# Patient Record
Sex: MALE | Race: WHITE | NOT HISPANIC OR LATINO | URBAN - METROPOLITAN AREA
[De-identification: names, ages, dates, MRNs, and addresses within clinical notes are randomized per-mention and may not be internally consistent; named-entity substitution may affect disease eponyms.]

---

## 2022-10-18 ENCOUNTER — TELEMEDICINE (OUTPATIENT)
Dept: PSYCHIATRY | Facility: CLINIC | Age: 32
End: 2022-10-18
Payer: COMMERCIAL

## 2022-10-18 DIAGNOSIS — F98.8 ATTENTION DEFICIT DISORDER PREDOMINANT INATTENTIVE TYPE: Primary | ICD-10-CM

## 2022-10-18 DIAGNOSIS — F41.1 GAD (GENERALIZED ANXIETY DISORDER): ICD-10-CM

## 2022-10-18 PROCEDURE — 99213 OFFICE O/P EST LOW 20 MIN: CPT | Performed by: NURSE PRACTITIONER

## 2022-10-18 RX ORDER — DEXTROAMPHETAMINE SACCHARATE, AMPHETAMINE ASPARTATE MONOHYDRATE, DEXTROAMPHETAMINE SULFATE AND AMPHETAMINE SULFATE 5; 5; 5; 5 MG/1; MG/1; MG/1; MG/1
20 CAPSULE, EXTENDED RELEASE ORAL EVERY MORNING
COMMUNITY
End: 2022-10-18 | Stop reason: SDUPTHER

## 2022-10-18 RX ORDER — DEXTROAMPHETAMINE SACCHARATE, AMPHETAMINE ASPARTATE MONOHYDRATE, DEXTROAMPHETAMINE SULFATE AND AMPHETAMINE SULFATE 5; 5; 5; 5 MG/1; MG/1; MG/1; MG/1
20 CAPSULE, EXTENDED RELEASE ORAL EVERY MORNING
Qty: 90 CAPSULE | Refills: 0 | Status: SHIPPED | OUTPATIENT
Start: 2022-10-18

## 2022-10-18 RX ORDER — BUSPIRONE HYDROCHLORIDE 10 MG/1
10 TABLET ORAL 2 TIMES DAILY
Qty: 60 TABLET | Refills: 1 | Status: SHIPPED | OUTPATIENT
Start: 2022-10-18

## 2022-10-18 RX ORDER — BUSPIRONE HYDROCHLORIDE 10 MG/1
10 TABLET ORAL 2 TIMES DAILY
COMMUNITY
End: 2022-10-18 | Stop reason: SDUPTHER

## 2022-10-18 NOTE — PSYCH
Psychiatric Medication Management - [address] [facility] 29 y o  male MRN: [id number]        This note was not shared with the patient due to reasonable likelihood of causing patient harm    Reason for Visit: mood and focus- virtual visit    Subjective: Last seen on 09/28/22  He is on Adderall XR 20 mg am and Buspirone 10 mg bid  Tolerates medications well  Sleep is stable  Appetite is healthy  Describes periods of feeling low and numb every few weeks with no apparent triggers  Denies SI/HI  Energy and motivation overall stable  Socializes with friends on regular basis  " I do like to play video games " Anxiety has improved with Buspirone  Focus and attentions improved at work; " even my boss notices " Overall better      Review Of Systems:     Constitutional Negative   ENT Negative   Cardiovascular Negative   Respiratory Negative   Gastrointestinal Negative   Genitourinary Negative   Musculoskeletal Negative   Integumentary Negative   Neurological Negative   Endocrine Negative     Past Medical History: pt denies     Past Psychiatric History: treated for ADHD in elementary school    Family Psychiatric History: depression and anxiety    Substance Abuse History: pt denies     Traumatic History: pt denies     The following portions of the patient's history were reviewed and updated as appropriate: past family history, past medical history, past social history, past surgical history and problem list       Mental status:  Appearance Casually dressed   Mood improved   Affect Mood congruent   Speech Normal   Thought Processes Goal directed   Associations intact   Hallucinations  Denies any auditory or visual hallucinations   Thought Content No passive or active suicidal or homicidal ideation, intent, or plan   Orientation Oriented to person, place, time, and situation   Recent and Remote Memory Intact   Attention Span and Concentration Intact   Intellect Appears to be of Average Intelligence   Insight Intact Judgement Intact   Muscle Strength No abnormal movements   Language Within Normal Limits   Fund of Knowledge Age appropriate   Pain NA       Assessment/Plan: improved anxiety and focus/ continue Adderall XR 20 mg am and Buspirone 10 mg bid  Follow up in 3 weeks     Diagnosis: ADHD, inattentive type, HERMES    Controlled Medication Discussion: Discussed with patient Black Box warning on concurrent use of benzodiazepines and opioid medications including sedation, respiratory depression, coma and death  Patient understands the risk of treatment with benzodiazepines in addition to opioids and wants to continue taking those medications  Psychotherapy Provided: Supportive psychotherapy provided  Yes  Medication education provided to [facility]        Visit Time    Visit Start Time: 1:44 pm  Visit Stop Time: 2: 05 pm  Total Visit Duration: 21 min

## 2022-11-08 ENCOUNTER — TELEMEDICINE (OUTPATIENT)
Dept: PSYCHIATRY | Facility: CLINIC | Age: 32
End: 2022-11-08

## 2022-11-08 DIAGNOSIS — F90.0 ATTENTION DEFICIT HYPERACTIVITY DISORDER (ADHD), PREDOMINANTLY INATTENTIVE TYPE: ICD-10-CM

## 2022-11-08 DIAGNOSIS — F40.10 SOCIAL ANXIETY DISORDER: ICD-10-CM

## 2022-11-08 DIAGNOSIS — F41.1 GAD (GENERALIZED ANXIETY DISORDER): ICD-10-CM

## 2022-11-08 RX ORDER — DEXTROAMPHETAMINE SACCHARATE, AMPHETAMINE ASPARTATE MONOHYDRATE, DEXTROAMPHETAMINE SULFATE AND AMPHETAMINE SULFATE 5; 5; 5; 5 MG/1; MG/1; MG/1; MG/1
20 CAPSULE, EXTENDED RELEASE ORAL EVERY MORNING
Qty: 30 CAPSULE | Refills: 0 | Status: SHIPPED | OUTPATIENT
Start: 2022-11-08

## 2022-11-08 RX ORDER — DEXTROAMPHETAMINE SACCHARATE, AMPHETAMINE ASPARTATE MONOHYDRATE, DEXTROAMPHETAMINE SULFATE AND AMPHETAMINE SULFATE 5; 5; 5; 5 MG/1; MG/1; MG/1; MG/1
20 CAPSULE, EXTENDED RELEASE ORAL EVERY MORNING
Qty: 30 CAPSULE | Refills: 0 | Status: SHIPPED | OUTPATIENT
Start: 2022-12-04

## 2022-11-08 RX ORDER — BUSPIRONE HYDROCHLORIDE 10 MG/1
10 TABLET ORAL 2 TIMES DAILY
Qty: 180 TABLET | Refills: 0 | Status: SHIPPED | OUTPATIENT
Start: 2022-11-08

## 2022-11-08 NOTE — PSYCH
Psychiatric Medication Management - [address] [facility] [address] [address]  male MRN: [id number]        This note was not shared with the patient due to reasonable likelihood of causing patient harm    Reason for Visit: mood and focus- virtual visit    Subjective: " I feel like everything has been pretty good " He is on Adderall XR 20 mg am and Buspirone 10 mg bid  Tolerates medications well  Sleep is stable  Appetite is healthy  Mood is reported as stable  " I feel pretty good and collected " Denies SI/HI  Energy and motivation overall stable  Socializes with friends on regular basis  " I do like to play video games " Anxiety has improved with Buspirone  Focus and attentions improved at work  Overall better      Review Of Systems:     Constitutional Negative   ENT Negative   Cardiovascular Negative   Respiratory Negative   Gastrointestinal Negative   Genitourinary Negative   Musculoskeletal Negative   Integumentary Negative   Neurological Negative   Endocrine Negative     Past Medical History: pt denies     Past Psychiatric History: treated for ADHD in elementary school    Family Psychiatric History: depression and anxiety    Substance Abuse History: pt denies     Traumatic History: pt denies     The following portions of the patient's history were reviewed and updated as appropriate: past family history, past medical history, past social history, past surgical history and problem list       Mental status:  Appearance Casually dressed   Mood improved   Affect Mood congruent   Speech Normal   Thought Processes Goal directed   Associations intact   Hallucinations  Denies any auditory or visual hallucinations   Thought Content No passive or active suicidal or homicidal ideation, intent, or plan   Orientation Oriented to person, place, time, and situation   Recent and Remote Memory Intact   Attention Span and Concentration Intact   Intellect Appears to be of Average Intelligence   Insight Intact   Judgement Intact Muscle Strength No abnormal movements   Language Within Normal Limits   Fund of Knowledge Age appropriate   Pain NA       Assessment/Plan: improved anxiety and focus/ continue Adderall XR 20 mg am and Buspirone 10 mg bid  Follow up in 3 weeks  Adderall XR prescription sent to [facility] and Buspirone sent to [address] [place] [address] for 90 days     Diagnosis: ADHD, inattentive type, JESSI, Social Anxiety    Controlled Medication Discussion: Discussed with patient Black Box warning on concurrent use of benzodiazepines and opioid medications including sedation, respiratory depression, coma and death  Patient understands the risk of treatment with benzodiazepines in addition to opioids and wants to continue taking those medications  Psychotherapy Provided: Supportive psychotherapy provided  Yes  Medication education provided to [facility]        Visit Time    Visit Start Time: 1:44 pm  Visit Stop Time: 2: 05 pm  Total Visit Duration: 21 min

## 2022-11-23 ENCOUNTER — TELEPHONE (OUTPATIENT)
Dept: PSYCHIATRY | Facility: CLINIC | Age: 32
End: 2022-11-23

## 2022-11-23 NOTE — TELEPHONE ENCOUNTER
Client calling for med refill of Adderall XR  Client has RX on file  Call to pharmacy to confirm  Client has not filled RX from 11/8  Pharmacy doesn't have generic Adderall in stock, but has brand name Adderall  Insurance company does not require prior authorization so they will process RX for client  Call to client to inform of above

## 2022-12-27 DIAGNOSIS — F40.10 SOCIAL ANXIETY DISORDER: ICD-10-CM

## 2022-12-27 DIAGNOSIS — F90.0 ATTENTION DEFICIT HYPERACTIVITY DISORDER (ADHD), PREDOMINANTLY INATTENTIVE TYPE: ICD-10-CM

## 2022-12-27 RX ORDER — DEXTROAMPHETAMINE SACCHARATE, AMPHETAMINE ASPARTATE MONOHYDRATE, DEXTROAMPHETAMINE SULFATE AND AMPHETAMINE SULFATE 5; 5; 5; 5 MG/1; MG/1; MG/1; MG/1
20 CAPSULE, EXTENDED RELEASE ORAL EVERY MORNING
Qty: 30 CAPSULE | Refills: 0 | Status: CANCELLED | OUTPATIENT
Start: 2022-12-27

## 2022-12-27 RX ORDER — DEXTROAMPHETAMINE SACCHARATE, AMPHETAMINE ASPARTATE MONOHYDRATE, DEXTROAMPHETAMINE SULFATE AND AMPHETAMINE SULFATE 5; 5; 5; 5 MG/1; MG/1; MG/1; MG/1
20 CAPSULE, EXTENDED RELEASE ORAL EVERY MORNING
Qty: 30 CAPSULE | Refills: 0 | Status: SHIPPED | OUTPATIENT
Start: 2022-12-27 | End: 2023-01-03 | Stop reason: SDUPTHER

## 2022-12-27 NOTE — TELEPHONE ENCOUNTER
Medication Refill Request     Name of Medication Adderall XR  Dose/Frequency 20 mg 1x a day  Quantity 30 caps  Verified pharmacy   [x]  Verified ordering Provider   [x]  Does patient have enough for the next 3 days? Yes [x] No []  Does patient have a follow-up appointment scheduled?  Yes [] No []   If so when is appointment:   1/3/23 @140pm

## 2022-12-27 NOTE — TELEPHONE ENCOUNTER
Patient left message stating he needs adderall script sent to a different pharmacy  Requesting script be sent to [facility] on [address] in [place] [address]  Forwarding for approval  Last fill per PDMP: 11/23/2022

## 2023-01-03 ENCOUNTER — TELEPHONE (OUTPATIENT)
Dept: PSYCHIATRY | Facility: CLINIC | Age: 33
End: 2023-01-03

## 2023-01-03 ENCOUNTER — TELEMEDICINE (OUTPATIENT)
Dept: PSYCHIATRY | Facility: CLINIC | Age: 33
End: 2023-01-03

## 2023-01-03 DIAGNOSIS — F40.10 SOCIAL ANXIETY DISORDER: ICD-10-CM

## 2023-01-03 DIAGNOSIS — F41.1 GAD (GENERALIZED ANXIETY DISORDER): Primary | ICD-10-CM

## 2023-01-03 DIAGNOSIS — F98.8 ATTENTION DEFICIT DISORDER PREDOMINANT INATTENTIVE TYPE: ICD-10-CM

## 2023-01-03 RX ORDER — BUSPIRONE HYDROCHLORIDE 15 MG/1
15 TABLET ORAL 2 TIMES DAILY
Qty: 60 TABLET | Refills: 1 | Status: SHIPPED | OUTPATIENT
Start: 2023-01-03

## 2023-01-03 RX ORDER — DEXTROAMPHETAMINE SACCHARATE, AMPHETAMINE ASPARTATE MONOHYDRATE, DEXTROAMPHETAMINE SULFATE AND AMPHETAMINE SULFATE 5; 5; 5; 5 MG/1; MG/1; MG/1; MG/1
20 CAPSULE, EXTENDED RELEASE ORAL EVERY MORNING
Qty: 30 CAPSULE | Refills: 0 | Status: SHIPPED | OUTPATIENT
Start: 2023-01-03

## 2023-01-03 NOTE — TELEPHONE ENCOUNTER
Left message for Cathy Ortiz and/or Parent/Guardian to call office back at [phone] to schedule appointment with [facility]      Reason:   Follow up 6 Weeks 20 Mins     Last completed appointment with provider:   1/3/23

## 2023-01-03 NOTE — PSYCH
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  Anxiety is intermittent  Denies depression symptoms  Initial insomnia most nights  He admits he plays on his computer late into the night  Work is chaotic  He is on Adderall XR 20 mg am and Buspirone 10 mg bid  Tolerates medications well  Appetite is healthy  Denies SI/HI  Energy and motivation levels fluctuate  Socializes with friends on regular basis  Focus and attentions improved at work  Will increase Buspirone to 15 mg bid to improve anxiety      Review Of Systems:     Constitutional Negative   ENT Negative   Cardiovascular Negative   Respiratory Negative   Gastrointestinal Negative   Genitourinary Negative   Musculoskeletal Negative   Integumentary Negative   Neurological Negative   Endocrine Negative     Past Medical History: pt denies     Past Psychiatric History: treated for ADHD in elementary school    Family Psychiatric History: depression and anxiety    Substance Abuse History: pt denies     Traumatic History: pt denies     The following portions of the patient's history were reviewed and updated as appropriate: past family history, past medical history, past social history, past surgical history and problem list       Mental status:  Appearance Casually dressed   Mood anxiety   Affect Mood congruent   Speech Normal   Thought Processes Goal directed   Associations intact   Hallucinations  Denies any auditory or visual hallucinations   Thought Content No passive or active suicidal or homicidal ideation, intent, or plan   Orientation Oriented to person, place, time, and situation   Recent and Remote Memory Intact   Attention Span and Concentration Intact   Intellect Appears to be of Average Intelligence   Insight Intact   Judgement Intact   Muscle Strength No abnormal movements   Language Within Normal Limits   Fund of Knowledge Age appropriate   Pain NA       Assessment/Plan: anxiety symptoms/ increase Buspirone to 15 mg bid for anxiety, continue Adderall XR 20 mg am  Follow up in 4 weeks  Adderall XR prescription sent to [facility] in [place] and Buspirone sent to [address] [place] [address] for 90 days     Diagnosis: ADHD, inattentive type, JESSI, Social Anxiety    Controlled Medication Discussion: Discussed with patient Black Box warning on concurrent use of benzodiazepines and opioid medications including sedation, respiratory depression, coma and death  Patient understands the risk of treatment with benzodiazepines in addition to opioids and wants to continue taking those medications  Psychotherapy Provided: Supportive psychotherapy provided  Yes  Medication education provided to [facility]      Visit Time    Visit Start Time: 1:45 pm  Visit Stop Time: 2:05 pm  Total Visit Duration: 20 min

## 2023-02-01 DIAGNOSIS — F40.10 SOCIAL ANXIETY DISORDER: ICD-10-CM

## 2023-02-01 DIAGNOSIS — F90.0 ATTENTION DEFICIT HYPERACTIVITY DISORDER (ADHD), PREDOMINANTLY INATTENTIVE TYPE: ICD-10-CM

## 2023-02-01 RX ORDER — DEXTROAMPHETAMINE SACCHARATE, AMPHETAMINE ASPARTATE MONOHYDRATE, DEXTROAMPHETAMINE SULFATE AND AMPHETAMINE SULFATE 5; 5; 5; 5 MG/1; MG/1; MG/1; MG/1
20 CAPSULE, EXTENDED RELEASE ORAL EVERY MORNING
Qty: 30 CAPSULE | Refills: 0 | Status: CANCELLED | OUTPATIENT
Start: 2023-02-01

## 2023-02-01 RX ORDER — DEXTROAMPHETAMINE SACCHARATE, AMPHETAMINE ASPARTATE MONOHYDRATE, DEXTROAMPHETAMINE SULFATE AND AMPHETAMINE SULFATE 5; 5; 5; 5 MG/1; MG/1; MG/1; MG/1
20 CAPSULE, EXTENDED RELEASE ORAL EVERY MORNING
Qty: 30 CAPSULE | Refills: 0 | Status: SHIPPED | OUTPATIENT
Start: 2023-02-01 | End: 2023-02-22 | Stop reason: SDUPTHER

## 2023-02-01 NOTE — TELEPHONE ENCOUNTER
Medication Refill Request     Name of Medication Adderall Xr   Dose/Frequency 20 Mg 1 x daily   Quantity 30  Verified pharmacy   [x]  Verified ordering Provider   [x]  Does patient have enough for the next 3 days? Yes [] No [x]  Does patient have a follow-up appointment scheduled?  Yes [x] No []   If so when is appointment: 2/22/23

## 2023-02-22 ENCOUNTER — TELEMEDICINE (OUTPATIENT)
Dept: PSYCHIATRY | Facility: CLINIC | Age: 33
End: 2023-02-22

## 2023-02-22 ENCOUNTER — TELEPHONE (OUTPATIENT)
Dept: PSYCHIATRY | Facility: CLINIC | Age: 33
End: 2023-02-22

## 2023-02-22 DIAGNOSIS — F41.1 GAD (GENERALIZED ANXIETY DISORDER): ICD-10-CM

## 2023-02-22 DIAGNOSIS — F40.10 SOCIAL ANXIETY DISORDER: ICD-10-CM

## 2023-02-22 DIAGNOSIS — F98.8 ATTENTION DEFICIT DISORDER PREDOMINANT INATTENTIVE TYPE: Primary | ICD-10-CM

## 2023-02-22 RX ORDER — DEXTROAMPHETAMINE SACCHARATE, AMPHETAMINE ASPARTATE MONOHYDRATE, DEXTROAMPHETAMINE SULFATE AND AMPHETAMINE SULFATE 5; 5; 5; 5 MG/1; MG/1; MG/1; MG/1
20 CAPSULE, EXTENDED RELEASE ORAL EVERY MORNING
Qty: 30 CAPSULE | Refills: 0 | Status: SHIPPED | OUTPATIENT
Start: 2023-02-27

## 2023-02-22 RX ORDER — BUSPIRONE HYDROCHLORIDE 15 MG/1
15 TABLET ORAL 2 TIMES DAILY
Qty: 180 TABLET | Refills: 1 | Status: SHIPPED | OUTPATIENT
Start: 2023-02-22 | End: 2023-02-22 | Stop reason: SDUPTHER

## 2023-02-22 RX ORDER — DEXTROAMPHETAMINE SACCHARATE, AMPHETAMINE ASPARTATE, DEXTROAMPHETAMINE SULFATE AND AMPHETAMINE SULFATE 2.5; 2.5; 2.5; 2.5 MG/1; MG/1; MG/1; MG/1
TABLET ORAL
Qty: 15 TABLET | Refills: 0 | Status: SHIPPED | OUTPATIENT
Start: 2023-02-22

## 2023-02-22 RX ORDER — BUSPIRONE HYDROCHLORIDE 15 MG/1
15 TABLET ORAL 2 TIMES DAILY
Qty: 180 TABLET | Refills: 1 | Status: SHIPPED | OUTPATIENT
Start: 2023-02-22

## 2023-02-22 NOTE — PSYCH
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  Anxiety has stabilized with recent Buspar increase  Has been going to the gym 3 times a week  Denies depression symptoms  Energy and motivation levels are stable  Socializes with friends on regular basis  Denies SI/HI  Appetite is stable  Initial insomnia some nights  Pt will try OTC Melatonin 3 mg hs prn  Will add Adderall 10 mg at 3 pm ( 15/ month) to improve focus in the early evenings  Pt will continue Buspirone 15 mg bid      Review Of Systems:     Constitutional Negative   ENT Negative   Cardiovascular Negative   Respiratory Negative   Gastrointestinal Negative   Genitourinary Negative   Musculoskeletal Negative   Integumentary Negative   Neurological Negative   Endocrine Negative     Past Medical History: pt denies     Past Psychiatric History: treated for ADHD in elementary school    Family Psychiatric History: depression and anxiety    Substance Abuse History: pt denies     Traumatic History: pt denies     The following portions of the patient's history were reviewed and updated as appropriate: past family history, past medical history, past social history, past surgical history and problem list       Mental status:  Appearance Casually dressed   Mood anxiety   Affect Mood congruent   Speech Normal   Thought Processes Goal directed   Associations intact   Hallucinations  Denies any auditory or visual hallucinations   Thought Content No passive or active suicidal or homicidal ideation, intent, or plan   Orientation Oriented to person, place, time, and situation   Recent and Remote Memory Intact   Attention Span and Concentration Intact   Intellect Appears to be of Average Intelligence   Insight Intact   Judgement Intact   Muscle Strength No abnormal movements   Language Within Normal Limits   Fund of Knowledge Age appropriate   Pain NA       Assessment/Plan: anxiety symptoms/ continue Buspirone to 15 mg bid for anxiety, continue Adderall XR 20 mg am, start Melatonin 3 mg hs prn insomnia and add Adderall 10 mg at 3 pm  Follow up in 4 weeks  Adderall XR prescription sent to [facility] in [place] and Buspirone sent to [address] [place] [address] for 90 days     Diagnosis: ADHD, inattentive type, JESSI, Social Anxiety    Controlled Medication Discussion: Discussed with patient Black Box warning on concurrent use of benzodiazepines and opioid medications including sedation, respiratory depression, coma and death  Patient understands the risk of treatment with benzodiazepines in addition to opioids and wants to continue taking those medications  Psychotherapy Provided: Supportive psychotherapy provided  Yes  Medication education provided to [facility]      Visit Time    Visit Start Time: 09:32 am  Visit Stop Time: 09:43 am  Total Visit Duration: 11 min

## 2023-02-22 NOTE — TELEPHONE ENCOUNTER
Left message for Hansel Gonzales  and/or Parent/Guardian to call office back at [phone] to schedule appointment with Steve Cantu     Reason:   Virtual 4 week 20 min f/u    Last completed appointment with provider:   2/22/23

## 2023-04-17 DIAGNOSIS — F98.8 ATTENTION DEFICIT DISORDER PREDOMINANT INATTENTIVE TYPE: ICD-10-CM

## 2023-04-17 RX ORDER — DEXTROAMPHETAMINE SACCHARATE, AMPHETAMINE ASPARTATE, DEXTROAMPHETAMINE SULFATE AND AMPHETAMINE SULFATE 2.5; 2.5; 2.5; 2.5 MG/1; MG/1; MG/1; MG/1
TABLET ORAL
Qty: 15 TABLET | Refills: 0 | Status: CANCELLED | OUTPATIENT
Start: 2023-04-17

## 2023-04-17 RX ORDER — DEXTROAMPHETAMINE SACCHARATE, AMPHETAMINE ASPARTATE MONOHYDRATE, DEXTROAMPHETAMINE SULFATE AND AMPHETAMINE SULFATE 5; 5; 5; 5 MG/1; MG/1; MG/1; MG/1
20 CAPSULE, EXTENDED RELEASE ORAL EVERY MORNING
Qty: 30 CAPSULE | Refills: 0 | Status: CANCELLED | OUTPATIENT
Start: 2023-04-17

## 2023-04-20 NOTE — TELEPHONE ENCOUNTER
Spoke with pt, gave message    Pt said that they did log on yesterday for the appt but waited in the waiting room for about 45mins before hanging up but is rescheduled for tomorrow afternoon

## 2023-05-22 ENCOUNTER — TELEMEDICINE (OUTPATIENT)
Dept: PSYCHIATRY | Facility: CLINIC | Age: 33
End: 2023-05-22

## 2023-05-22 DIAGNOSIS — F98.8 ATTENTION DEFICIT DISORDER PREDOMINANT INATTENTIVE TYPE: ICD-10-CM

## 2023-05-22 DIAGNOSIS — F41.1 GAD (GENERALIZED ANXIETY DISORDER): Primary | ICD-10-CM

## 2023-05-22 DIAGNOSIS — F40.10 SOCIAL ANXIETY DISORDER: ICD-10-CM

## 2023-05-22 RX ORDER — DEXTROAMPHETAMINE SACCHARATE, AMPHETAMINE ASPARTATE, DEXTROAMPHETAMINE SULFATE AND AMPHETAMINE SULFATE 2.5; 2.5; 2.5; 2.5 MG/1; MG/1; MG/1; MG/1
TABLET ORAL
Qty: 30 TABLET | Refills: 0 | Status: SHIPPED | OUTPATIENT
Start: 2023-06-17

## 2023-05-22 RX ORDER — DEXTROAMPHETAMINE SACCHARATE, AMPHETAMINE ASPARTATE MONOHYDRATE, DEXTROAMPHETAMINE SULFATE AND AMPHETAMINE SULFATE 5; 5; 5; 5 MG/1; MG/1; MG/1; MG/1
20 CAPSULE, EXTENDED RELEASE ORAL EVERY MORNING
Qty: 30 CAPSULE | Refills: 0 | Status: SHIPPED | OUTPATIENT
Start: 2023-06-17

## 2023-05-22 RX ORDER — BUSPIRONE HYDROCHLORIDE 15 MG/1
15 TABLET ORAL 2 TIMES DAILY
Qty: 180 TABLET | Refills: 1 | Status: SHIPPED | OUTPATIENT
Start: 2023-05-22

## 2023-05-22 RX ORDER — DEXTROAMPHETAMINE SACCHARATE, AMPHETAMINE ASPARTATE, DEXTROAMPHETAMINE SULFATE AND AMPHETAMINE SULFATE 2.5; 2.5; 2.5; 2.5 MG/1; MG/1; MG/1; MG/1
TABLET ORAL
Qty: 30 TABLET | Refills: 0 | Status: SHIPPED | OUTPATIENT
Start: 2023-07-13

## 2023-05-22 RX ORDER — DEXTROAMPHETAMINE SACCHARATE, AMPHETAMINE ASPARTATE MONOHYDRATE, DEXTROAMPHETAMINE SULFATE AND AMPHETAMINE SULFATE 5; 5; 5; 5 MG/1; MG/1; MG/1; MG/1
20 CAPSULE, EXTENDED RELEASE ORAL EVERY MORNING
Qty: 30 CAPSULE | Refills: 0 | Status: SHIPPED | OUTPATIENT
Start: 2023-07-13

## 2023-05-22 RX ORDER — DEXTROAMPHETAMINE SACCHARATE, AMPHETAMINE ASPARTATE MONOHYDRATE, DEXTROAMPHETAMINE SULFATE AND AMPHETAMINE SULFATE 5; 5; 5; 5 MG/1; MG/1; MG/1; MG/1
20 CAPSULE, EXTENDED RELEASE ORAL EVERY MORNING
Qty: 30 CAPSULE | Refills: 0 | Status: SHIPPED | OUTPATIENT
Start: 2023-05-22

## 2023-05-22 RX ORDER — DEXTROAMPHETAMINE SACCHARATE, AMPHETAMINE ASPARTATE, DEXTROAMPHETAMINE SULFATE AND AMPHETAMINE SULFATE 2.5; 2.5; 2.5; 2.5 MG/1; MG/1; MG/1; MG/1
TABLET ORAL
Qty: 30 TABLET | Refills: 0 | Status: SHIPPED | OUTPATIENT
Start: 2023-05-22

## 2023-05-22 NOTE — PSYCH
"Psychiatric Medication Management - [address] [facility] 34 y o  male MRN: [id number]        This note was not shared with the patient due to reasonable likelihood of causing patient harm    Reason for Visit: focus and concentration issues- this is a phone visit, edge browser circling- not connecting    Subjective: Pt observed on  Adderall XR 20 mg am, Adderall 10 mg at 3 pm, Buspirone 15 mg bid and Melatonin 5 mg hs prn  Tolerates medications well  Appetite and sleep are reported as stable  \" Depression has been minimal \" Anxiety is reported as stable  Enjoys socializing with friends  Improved focus has helped with work productivity  Has been going to the gym 3 times a week  Energy and motivation levels are stable  Denies SI/HI  Overall better       Review Of Systems:     Constitutional Negative   ENT Negative   Cardiovascular Negative   Respiratory Negative   Gastrointestinal Negative   Genitourinary Negative   Musculoskeletal Negative   Integumentary Negative   Neurological Negative   Endocrine Negative     Past Medical History: pt denies     Past Psychiatric History: treated for ADHD in elementary school    Family Psychiatric History: depression and anxiety    Substance Abuse History: pt denies     Traumatic History: pt denies     The following portions of the patient's history were reviewed and updated as appropriate: past family history, past medical history, past social history, past surgical history and problem list       Mental status:  Appearance Casually dressed   Mood stable   Affect Mood congruent   Speech Normal   Thought Processes Goal directed   Associations intact   Hallucinations  Denies any auditory or visual hallucinations   Thought Content No passive or active suicidal or homicidal ideation, intent, or plan   Orientation Oriented to person, place, time, and situation   Recent and Remote Memory Intact   Attention Span and Concentration Intact   Intellect Appears to be of Average " Intelligence   Insight Intact   Judgement Intact   Muscle Strength No abnormal movements   Language Within Normal Limits   Fund of Knowledge Age appropriate   Pain NA       Assessment/Plan: stable symptoms/ continue Buspirone to 15 mg bid for anxiety, continue Adderall XR 20 mg am, Melatonin 5 mg hs prn insomnia and Adderall 10 mg at 3 pm  Pt aware this prescriber leaving and he will follow up with Jaimee Segovia in 12 weeks  Adderall XR prescriptions sent to [facility] in [place] and Buspirone sent to [address] [place] [address] for 90 days     Diagnosis: ADHD, inattentive type, JESSI, Social Anxiety    Controlled Medication Discussion: Discussed with patient Black Box warning on concurrent use of benzodiazepines and opioid medications including sedation, respiratory depression, coma and death  Patient understands the risk of treatment with benzodiazepines in addition to opioids and wants to continue taking those medications  Psychotherapy Provided: Supportive psychotherapy provided  Yes  Medication education provided to [facility]      Visit Time    Visit Start Time: 09:27 am  Visit Stop Time: 09:37 am  Total Visit Duration: 10 min

## 2023-08-22 ENCOUNTER — TELEMEDICINE (OUTPATIENT)
Dept: PSYCHIATRY | Facility: CLINIC | Age: 33
End: 2023-08-22
Payer: COMMERCIAL

## 2023-08-22 DIAGNOSIS — F98.8 ATTENTION DEFICIT DISORDER PREDOMINANT INATTENTIVE TYPE: ICD-10-CM

## 2023-08-22 DIAGNOSIS — F41.1 GAD (GENERALIZED ANXIETY DISORDER): ICD-10-CM

## 2023-08-22 PROCEDURE — 99212 OFFICE O/P EST SF 10 MIN: CPT

## 2023-08-22 RX ORDER — DEXTROAMPHETAMINE SACCHARATE, AMPHETAMINE ASPARTATE, DEXTROAMPHETAMINE SULFATE AND AMPHETAMINE SULFATE 2.5; 2.5; 2.5; 2.5 MG/1; MG/1; MG/1; MG/1
TABLET ORAL
Qty: 30 TABLET | Refills: 0 | Status: SHIPPED | OUTPATIENT
Start: 2023-08-22

## 2023-08-22 RX ORDER — BUSPIRONE HYDROCHLORIDE 15 MG/1
15 TABLET ORAL 2 TIMES DAILY
Qty: 180 TABLET | Refills: 1 | Status: SHIPPED | OUTPATIENT
Start: 2023-08-22

## 2023-08-22 RX ORDER — DEXTROAMPHETAMINE SACCHARATE, AMPHETAMINE ASPARTATE MONOHYDRATE, DEXTROAMPHETAMINE SULFATE AND AMPHETAMINE SULFATE 5; 5; 5; 5 MG/1; MG/1; MG/1; MG/1
20 CAPSULE, EXTENDED RELEASE ORAL EVERY MORNING
Qty: 30 CAPSULE | Refills: 0 | Status: SHIPPED | OUTPATIENT
Start: 2023-08-22

## 2023-08-22 NOTE — PSYCH
Psychiatric Medication Management - [address] [facility] 34 y.o. male MRN: [id number]        This note was not shared with the patient due to reasonable likelihood of causing patient harm     After connecting through [email or web address], the patient was identified by name and date of Neela Scale was informed that this is a telemedicine visit and that the visit is being conducted through the [employer]. He agrees to proceed. which may not be secure and therefore, might not be HIPAA-compliant. [facility] office door was closed. No one else was in the room. [facility] consent and understanding of privacy and security of the video platform. The patient has agreed to participate and understands they can discontinue the visit at any time. Reason for Visit: Medication management    Subjective: Former patient of Darryl Curiel is being treated for ADHD, JESSI, and social anxiety. Patient is observed on Adderall XR 20 mg am, Adderall 10 mg at 3 pm, Buspirone 15 mg bid and takes Melatonin 5 mg hs prn OTC and tolerates it well. Patient has been compliant with his medications and denies any side effects. Patient rates his depression a 3/10 and anxiety 6/10 on most days. Patient reports his mood has been stable since the last appointment. 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 Patient has been doing well at work but wants to change careers and is currently taking a  class. Sleep has been stable. Appetite fluctuates with adderall. Energy and motivation has been adequate. Patient has been socializing with friends and going to the gym 3x a week. Patient denies AH/VH, paranoia, elevated moods, panic attacks, and SI/HI.            Review Of Systems:     Constitutional Negative   ENT Negative   Cardiovascular Negative   Respiratory Negative   Gastrointestinal Negative   Genitourinary Negative   Musculoskeletal Negative Integumentary Negative   Neurological Negative   Endocrine Negative     Past Medical History: pt denies     Past Psychiatric History: treated for ADHD in elementary school    Family Psychiatric History: depression and anxiety    Substance Abuse History: pt denies     Traumatic History: pt denies     The following portions of the patient's history were reviewed and updated as appropriate: past family history, past medical history, past social history, past surgical history and problem list.      Mental status:  Appearance Casually dressed   Mood stable   Affect Mood congruent   Speech Normal   Thought Processes Goal directed   Associations intact   Hallucinations  Denies any auditory or visual hallucinations   Thought Content No passive or active suicidal or homicidal ideation, intent, or plan   Orientation Oriented to person, place, time, and situation   Recent and Remote Memory Intact   Attention Span and Concentration Intact   Intellect Appears to be of Average Intelligence   Insight Intact   Judgement Intact   Muscle Strength No abnormal movements   Language Within Normal Limits   Fund of Knowledge Age appropriate   Pain NA       Assessment/Plan:   1. Continue Buspirone 15mg bid for anxiety  2. Continue Adderall XR 20mg am  3. Continue Melatonin 5mg hs prn insomnia  4. Continue Adderall 10mg at 3 pm  5. Call if any adverse medication side effects  6. Follow up in 1 month     Diagnosis: ADHD, inattentive type, JESSI, Social Anxiety    Controlled Medication Discussion: Discussed with patient Black Box warning on concurrent use of benzodiazepines and opioid medications including sedation, respiratory depression, coma and death. Patient understands the risk of treatment with benzodiazepines in addition to opioids and wants to continue taking those medications. Psychotherapy Provided: Supportive psychotherapy provided. Yes  Medication education provided to [facility].     Visit Time    Visit Start Time: 09:00 am  Visit Stop Time: 09:15 am  Total Visit Duration: 15 min

## 2023-09-19 ENCOUNTER — TELEMEDICINE (OUTPATIENT)
Dept: PSYCHIATRY | Facility: CLINIC | Age: 33
End: 2023-09-19
Payer: COMMERCIAL

## 2023-09-19 DIAGNOSIS — F98.8 ATTENTION DEFICIT DISORDER PREDOMINANT INATTENTIVE TYPE: ICD-10-CM

## 2023-09-19 PROCEDURE — 99212 OFFICE O/P EST SF 10 MIN: CPT

## 2023-09-19 RX ORDER — DEXTROAMPHETAMINE SACCHARATE, AMPHETAMINE ASPARTATE, DEXTROAMPHETAMINE SULFATE AND AMPHETAMINE SULFATE 2.5; 2.5; 2.5; 2.5 MG/1; MG/1; MG/1; MG/1
TABLET ORAL
Qty: 30 TABLET | Refills: 0 | Status: SHIPPED | OUTPATIENT
Start: 2023-09-19

## 2023-09-19 RX ORDER — DEXTROAMPHETAMINE SACCHARATE, AMPHETAMINE ASPARTATE MONOHYDRATE, DEXTROAMPHETAMINE SULFATE AND AMPHETAMINE SULFATE 5; 5; 5; 5 MG/1; MG/1; MG/1; MG/1
20 CAPSULE, EXTENDED RELEASE ORAL EVERY MORNING
Qty: 30 CAPSULE | Refills: 0 | Status: SHIPPED | OUTPATIENT
Start: 2023-09-19

## 2023-09-19 NOTE — PSYCH
Psychiatric Medication Management - 25 [facility] 34 y.o. male MRN: [id number]        This note was not shared with the patient due to reasonable likelihood of causing patient harm     After connecting through televideo, the patient was identified by name and date of Elvia Murrell was informed that this is a telemedicine visit and that the visit is being conducted through the [employer]. He agrees to proceed. which may not be secure and therefore, might not be HIPAA-compliant. [facility] office door was closed. No one else was in the room. [facility] consent and understanding of privacy and security of the video platform. The patient has agreed to participate and understands they can discontinue the visit at any time. Reason for Visit: Medication management    Subjective:   Patient is being treated for ADHD, JESSI, and social anxiety. Patient is observed on Adderall XR 20 mg am, Adderall 10 mg at 3 pm, Buspirone 15 mg bid and takes Melatonin 5 mg hs prn OTC. Patient tolerates the medication well, has been compliant and denies any side effects. Patient rates his depression a 3/10 and anxiety a 6 or 7 out of 10 on most days. Patient reports the majority of his anxiety is due to multiple changes at work due to being bought out by another company. Patient also works a 2nd job at [employer]. Sleep and appetite has been stable. Energy and motivation has been adequate but "dip in the afternoon." Patient continues to go to the gym 3x a week and socializes with friends. Patient excited about going to a music festival in [place]. Patient denies AH/VH, paranoia, elevated moods, panic attacks, and SI/HI.             Review Of Systems:     Constitutional Negative   ENT Negative   Cardiovascular Negative   Respiratory Negative   Gastrointestinal Negative   Genitourinary Negative   Musculoskeletal Negative   Integumentary Negative   Neurological Negative   Endocrine Negative     Past Medical History: pt denies     Past Psychiatric History: treated for ADHD in elementary school    Family Psychiatric History: depression and anxiety    Substance Abuse History: pt denies     Traumatic History: pt denies     The following portions of the patient's history were reviewed and updated as appropriate: past family history, past medical history, past social history, past surgical history and problem list.      Mental status:  Appearance Casually dressed   Mood stable   Affect Mood congruent   Speech Normal   Thought Processes Goal directed   Associations intact   Hallucinations  Denies any auditory or visual hallucinations   Thought Content No passive or active suicidal or homicidal ideation, intent, or plan   Orientation Oriented to person, place, time, and situation   Recent and Remote Memory Intact   Attention Span and Concentration Intact   Intellect Appears to be of Average Intelligence   Insight Intact   Judgement Intact   Muscle Strength No abnormal movements   Language Within Normal Limits   Fund of Knowledge Age appropriate   Pain NA       Assessment/Plan:   1. Continue Buspirone 15mg bid for anxiety  2. Continue Adderall XR 20mg am  3. Continue Melatonin 5mg hs prn insomnia  4. Continue Adderall 10mg at 3 pm  5. Call if any adverse medication side effects  6. Follow up in 2 month     Diagnosis: ADHD, inattentive type, JESSI, Social Anxiety    Controlled Medication Discussion: Discussed with patient Black Box warning on concurrent use of benzodiazepines and opioid medications including sedation, respiratory depression, coma and death. Patient understands the risk of treatment with benzodiazepines in addition to opioids and wants to continue taking those medications. Psychotherapy Provided: Supportive psychotherapy provided. Yes  Medication education provided to [facility].     Visit Time    Visit Start Time: 09:00 am  Visit Stop Time: 09:15am  Total Visit Duration: 15 min

## 2023-11-14 ENCOUNTER — TELEMEDICINE (OUTPATIENT)
Dept: PSYCHIATRY | Facility: CLINIC | Age: 33
End: 2023-11-14
Payer: COMMERCIAL

## 2023-11-14 DIAGNOSIS — F98.8 ATTENTION DEFICIT DISORDER PREDOMINANT INATTENTIVE TYPE: ICD-10-CM

## 2023-11-14 PROCEDURE — 99212 OFFICE O/P EST SF 10 MIN: CPT

## 2023-11-14 RX ORDER — DEXTROAMPHETAMINE SACCHARATE, AMPHETAMINE ASPARTATE MONOHYDRATE, DEXTROAMPHETAMINE SULFATE AND AMPHETAMINE SULFATE 5; 5; 5; 5 MG/1; MG/1; MG/1; MG/1
20 CAPSULE, EXTENDED RELEASE ORAL EVERY MORNING
Qty: 30 CAPSULE | Refills: 0 | Status: SHIPPED | OUTPATIENT
Start: 2023-11-14

## 2023-11-14 RX ORDER — DEXTROAMPHETAMINE SACCHARATE, AMPHETAMINE ASPARTATE, DEXTROAMPHETAMINE SULFATE AND AMPHETAMINE SULFATE 2.5; 2.5; 2.5; 2.5 MG/1; MG/1; MG/1; MG/1
TABLET ORAL
Qty: 30 TABLET | Refills: 0 | Status: SHIPPED | OUTPATIENT
Start: 2023-11-14

## 2023-11-14 NOTE — PSYCH
Psychiatric Medication Management - 25 [facility] 34 y.o. male MRN: [id number]        This note was not shared with the patient due to reasonable likelihood of causing patient harm     After connecting through [email or web address], the patient was identified by name and date of birth. Loni Rizvi was informed that this is a telemedicine visit and that the visit is being conducted through the [employer]. He agrees to proceed. which may not be secure and therefore, might not be HIPAA-compliant. My office door was closed. No one else was in the room. He acknowledged consent and understanding of privacy and security of the video platform. The patient has agreed to participate and understands they can discontinue the visit at any time. Reason for Visit: Medication management    Subjective:    Patient is being treated for ADHD, JESSI, and social anxiety. Patient is observed on Adderall XR 20 mg am, Adderall 10 mg at 3 pm, Buspirone 15 mg bid and takes Melatonin 5 mg hs prn OTC. Patient tolerates the medication well, has been compliant and denies any side effects. Patient reports stable mood. Patient doing well at work and continues to work a 2nd job at [employer]. Sleep and appetite has been stable. Good energy and motivation. Patient enjoys going to the gym 3x a week and socializing with friends. Patient currently sick with a sinus infection.  Patient denies elevated moods, paranoia, panic attacks, ah/vh and si/hi          Review Of Systems:     Constitutional Negative   ENT Negative   Cardiovascular Negative   Respiratory Negative   Gastrointestinal Negative   Genitourinary Negative   Musculoskeletal Negative   Integumentary Negative   Neurological Negative   Endocrine Negative     Past Medical History: pt denies     Past Psychiatric History: treated for ADHD in elementary school    Family Psychiatric History: depression and anxiety    Substance Abuse History: pt denies     Traumatic History: pt denies     The following portions of the patient's history were reviewed and updated as appropriate: past family history, past medical history, past social history, past surgical history and problem list.      Mental status:  Appearance Casually dressed   Mood stable   Affect Mood congruent   Speech Normal   Thought Processes Goal directed   Associations intact   Hallucinations  Denies any auditory or visual hallucinations   Thought Content No passive or active suicidal or homicidal ideation, intent, or plan   Orientation Oriented to person, place, time, and situation   Recent and Remote Memory Intact   Attention Span and Concentration Intact   Intellect Appears to be of Average Intelligence   Insight Intact   Judgement Intact   Muscle Strength No abnormal movements   Language Within Normal Limits   Fund of Knowledge Age appropriate   Pain NA       Assessment/Plan:   1. Continue Buspirone 15mg bid for anxiety  2. Continue Adderall XR 20mg am  3. Continue Melatonin 5mg hs prn insomnia  4. Continue Adderall 10mg at 3 pm  5. Call if any adverse medication side effects  6. Follow up in 2 month     Diagnosis: ADHD, inattentive type, JESSI, Social Anxiety    Controlled Medication Discussion: Discussed with patient Black Box warning on concurrent use of benzodiazepines and opioid medications including sedation, respiratory depression, coma and death. Patient understands the risk of treatment with benzodiazepines in addition to opioids and wants to continue taking those medications. Psychotherapy Provided: Supportive psychotherapy provided. Yes  Medication education provided to [facility].     Visit Time    Visit Start Time: 09:30 am  Visit Stop Time: 09:40 am  Total Visit Duration: 10 min

## 2024-01-16 ENCOUNTER — TELEMEDICINE (OUTPATIENT)
Dept: PSYCHIATRY | Facility: CLINIC | Age: 34
End: 2024-01-16
Payer: COMMERCIAL

## 2024-01-16 DIAGNOSIS — F98.8 ATTENTION DEFICIT DISORDER PREDOMINANT INATTENTIVE TYPE: ICD-10-CM

## 2024-01-16 DIAGNOSIS — F41.1 GAD (GENERALIZED ANXIETY DISORDER): ICD-10-CM

## 2024-01-16 PROCEDURE — 99212 OFFICE O/P EST SF 10 MIN: CPT

## 2024-01-16 RX ORDER — DEXTROAMPHETAMINE SACCHARATE, AMPHETAMINE ASPARTATE MONOHYDRATE, DEXTROAMPHETAMINE SULFATE AND AMPHETAMINE SULFATE 5; 5; 5; 5 MG/1; MG/1; MG/1; MG/1
20 CAPSULE, EXTENDED RELEASE ORAL EVERY MORNING
Qty: 30 CAPSULE | Refills: 0 | Status: SHIPPED | OUTPATIENT
Start: 2024-01-16

## 2024-01-16 RX ORDER — DEXTROAMPHETAMINE SACCHARATE, AMPHETAMINE ASPARTATE, DEXTROAMPHETAMINE SULFATE AND AMPHETAMINE SULFATE 2.5; 2.5; 2.5; 2.5 MG/1; MG/1; MG/1; MG/1
TABLET ORAL
Qty: 30 TABLET | Refills: 0 | Status: SHIPPED | OUTPATIENT
Start: 2024-01-16

## 2024-01-16 NOTE — PSYCH
"Psychiatric Medication Management - Behavioral Health   Martinez Davey 33 y.o. male MRN: [id number]        This note was not shared with the patient due to reasonable likelihood of causing patient harm     After connecting through televideo, the patient was identified by name and date of birth. Martinez Davey was informed that this is a telemedicine visit and that the visit is being conducted through the [employer] platform. He agrees to proceed. which may not be secure and therefore, might not be HIPAA-compliant.  My office door was closed. No one else was in the room.  He acknowledged consent and understanding of privacy and security of the video platform. The patient has agreed to participate and understands they can discontinue the visit at any time.    Reason for Visit: Medication management    Subjective:   Patient is being treated for ADHD, JESSI, and social anxiety. Patient is observed on Adderall XR 20 mg am, Adderall 10 mg at 3 pm, Buspirone 15 mg bid and takes Melatonin 5 mg hs prn OTC. Patient tolerates the medication well, has been compliant and denies any side effects. Patient reports stable mood and rates depression 1/10 and anxiety 3 or 4/10 mostly due to the stress at work. Patient reports his full time job has been going well but took a month off his 2nd job at [employer] and will be going back in February. Otherwise \"everything seems to be going good.\" Sleep and appetite has been stable. Energy and motivation \"a little low\" but states its normal during the winter. Patient continues to go to gym 3x a week. Patient denies elevated moods, paranoia, panic attacks, ah/vh and si/hi        Review Of Systems:     Constitutional Negative   ENT Negative   Cardiovascular Negative   Respiratory Negative   Gastrointestinal Negative   Genitourinary Negative   Musculoskeletal Negative   Integumentary Negative   Neurological Negative   Endocrine Negative     Past Medical History: pt denies     Past Psychiatric " History: treated for ADHD in elementary school    Family Psychiatric History: depression and anxiety    Substance Abuse History: pt denies     Traumatic History: pt denies     The following portions of the patient's history were reviewed and updated as appropriate: past family history, past medical history, past social history, past surgical history and problem list.      Mental status:  Appearance Casually dressed   Mood stable   Affect Mood congruent   Speech Normal   Thought Processes Goal directed   Associations intact   Hallucinations  Denies any auditory or visual hallucinations   Thought Content No passive or active suicidal or homicidal ideation, intent, or plan   Orientation Oriented to person, place, time, and situation   Recent and Remote Memory Intact   Attention Span and Concentration Intact   Intellect Appears to be of Average Intelligence   Insight Intact   Judgement Intact   Muscle Strength No abnormal movements   Language Within Normal Limits   Fund of Knowledge Age appropriate   Pain NA       Assessment/Plan:   1. Continue Buspirone 15mg bid for anxiety  2. Continue Adderall XR 20mg am  3. Continue Melatonin 5mg hs prn insomnia  4. Continue Adderall 10mg at 3 pm  5. Call if any adverse medication side effects  6. Follow up in 2 month     Diagnosis: ADHD, inattentive type, JESSI, Social Anxiety    Controlled Medication Discussion: Discussed with patient Black Box warning on concurrent use of benzodiazepines and opioid medications including sedation, respiratory depression, coma and death. Patient understands the risk of treatment with benzodiazepines in addition to opioids and wants to continue taking those medications.      Psychotherapy Provided: Supportive psychotherapy provided.     Yes  Medication education provided to Martinez.    Visit Time    Visit Start Time: 09:30 am  Visit Stop Time: 09:40 am  Total Visit Duration: 10 min

## 2024-02-14 DIAGNOSIS — F98.8 ATTENTION DEFICIT DISORDER PREDOMINANT INATTENTIVE TYPE: ICD-10-CM

## 2024-02-14 RX ORDER — DEXTROAMPHETAMINE SACCHARATE, AMPHETAMINE ASPARTATE MONOHYDRATE, DEXTROAMPHETAMINE SULFATE AND AMPHETAMINE SULFATE 5; 5; 5; 5 MG/1; MG/1; MG/1; MG/1
20 CAPSULE, EXTENDED RELEASE ORAL EVERY MORNING
Qty: 30 CAPSULE | Refills: 0 | Status: SHIPPED | OUTPATIENT
Start: 2024-02-14

## 2024-02-14 NOTE — TELEPHONE ENCOUNTER
Pt requested refill of the amphetamine XR 20mg be sent to the [facility] on file    Left message for pt to call back to schedule follow up appt    PDMP last filled 1/16

## 2024-03-18 ENCOUNTER — TELEPHONE (OUTPATIENT)
Dept: PSYCHIATRY | Facility: CLINIC | Age: 34
End: 2024-03-18

## 2024-03-18 NOTE — TELEPHONE ENCOUNTER
Pt sent an email lat Friday saying that he's losing his insurance on the 31st of this month and asked that a 2-3 month supply of his medications can be sent to the pharmacy so he does not run out    PDMP last filled 2/19

## 2024-03-20 DIAGNOSIS — F98.8 ATTENTION DEFICIT DISORDER PREDOMINANT INATTENTIVE TYPE: ICD-10-CM

## 2024-03-20 DIAGNOSIS — F41.1 GAD (GENERALIZED ANXIETY DISORDER): ICD-10-CM

## 2024-03-20 RX ORDER — DEXTROAMPHETAMINE SACCHARATE, AMPHETAMINE ASPARTATE MONOHYDRATE, DEXTROAMPHETAMINE SULFATE AND AMPHETAMINE SULFATE 5; 5; 5; 5 MG/1; MG/1; MG/1; MG/1
20 CAPSULE, EXTENDED RELEASE ORAL EVERY MORNING
Qty: 30 CAPSULE | Refills: 0 | Status: SHIPPED | OUTPATIENT
Start: 2024-03-20 | End: 2024-03-26

## 2024-03-20 RX ORDER — BUSPIRONE HYDROCHLORIDE 15 MG/1
15 TABLET ORAL 2 TIMES DAILY
Qty: 180 TABLET | Refills: 1 | Status: SHIPPED | OUTPATIENT
Start: 2024-03-20

## 2024-03-20 RX ORDER — DEXTROAMPHETAMINE SACCHARATE, AMPHETAMINE ASPARTATE, DEXTROAMPHETAMINE SULFATE AND AMPHETAMINE SULFATE 2.5; 2.5; 2.5; 2.5 MG/1; MG/1; MG/1; MG/1
TABLET ORAL
Qty: 30 TABLET | Refills: 0 | Status: SHIPPED | OUTPATIENT
Start: 2024-03-20 | End: 2024-03-26

## 2024-03-26 ENCOUNTER — TELEPHONE (OUTPATIENT)
Dept: PSYCHIATRY | Facility: CLINIC | Age: 34
End: 2024-03-26

## 2024-03-26 ENCOUNTER — TELEMEDICINE (OUTPATIENT)
Dept: PSYCHIATRY | Facility: CLINIC | Age: 34
End: 2024-03-26
Payer: COMMERCIAL

## 2024-03-26 DIAGNOSIS — F98.8 ATTENTION DEFICIT DISORDER PREDOMINANT INATTENTIVE TYPE: ICD-10-CM

## 2024-03-26 PROCEDURE — 99212 OFFICE O/P EST SF 10 MIN: CPT

## 2024-03-26 RX ORDER — DEXTROAMPHETAMINE SACCHARATE, AMPHETAMINE ASPARTATE, DEXTROAMPHETAMINE SULFATE AND AMPHETAMINE SULFATE 5; 5; 5; 5 MG/1; MG/1; MG/1; MG/1
20 TABLET ORAL
Qty: 60 TABLET | Refills: 0 | Status: SHIPPED | OUTPATIENT
Start: 2024-04-23

## 2024-03-26 RX ORDER — DEXTROAMPHETAMINE SACCHARATE, AMPHETAMINE ASPARTATE, DEXTROAMPHETAMINE SULFATE AND AMPHETAMINE SULFATE 5; 5; 5; 5 MG/1; MG/1; MG/1; MG/1
20 TABLET ORAL
Qty: 60 TABLET | Refills: 0 | Status: SHIPPED | OUTPATIENT
Start: 2024-03-26

## 2024-03-26 RX ORDER — DEXTROAMPHETAMINE SACCHARATE, AMPHETAMINE ASPARTATE, DEXTROAMPHETAMINE SULFATE AND AMPHETAMINE SULFATE 5; 5; 5; 5 MG/1; MG/1; MG/1; MG/1
20 TABLET ORAL
Qty: 60 TABLET | Refills: 0 | Status: SHIPPED | OUTPATIENT
Start: 2024-05-21

## 2024-03-26 NOTE — PSYCH
Psychiatric Medication Management - Behavioral Health   Martinez Davey 34 y.o. male MRN: [id number]        This note was not shared with the patient due to reasonable likelihood of causing patient harm     After connecting through televideo, the patient was identified by name and date of birth. Martinez Davey was informed that this is a telemedicine visit and that the visit is being conducted through the [employer] Emb[employer] platform. He agrees to proceed. My office door was closed. No one else was in the room.  He acknowledged consent and understanding of privacy and security of the video platform. The patient has agreed to participate and understands they can discontinue the visit at any time.    Reason for Visit: Medication management    Subjective:   Patient is being treated for ADHD, JESSI, and social anxiety. Patient is observed on Adderall XR 20 mg am, Adderall 10 mg at 3 pm, Buspirone 15 mg bid and takes Melatonin 5 mg hs prn OTC. Patient tolerates the medication well, has been compliant and denies any side effects. Status quo. Stable mood. Denies dysphoric moods including depression and anxiety. Patient reports he found a new job and due to increase in pay, he was able to quit his 2nd job at [employer]. Sleep and appetite has been adequate. Good energy and motivation. Patient denies elevated moods, paranoia, panic attacks, ah/vh and si/hi      Review Of Systems:     Constitutional Negative   ENT Negative   Cardiovascular Negative   Respiratory Negative   Gastrointestinal Negative   Genitourinary Negative   Musculoskeletal Negative   Integumentary Negative   Neurological Negative   Endocrine Negative     Past Medical History: pt denies     Past Psychiatric History: treated for ADHD in elementary school    Family Psychiatric History: depression and anxiety    Substance Abuse History: pt denies     Traumatic History: pt denies     The following portions of the patient's history were reviewed and updated as appropriate:  past family history, past medical history, past social history, past surgical history and problem list.      Mental status:  Appearance Casually dressed   Mood stable   Affect Mood congruent   Speech Normal   Thought Processes Goal directed   Associations intact   Hallucinations  Denies any auditory or visual hallucinations   Thought Content No passive or active suicidal or homicidal ideation, intent, or plan   Orientation Oriented to person, place, time, and situation   Recent and Remote Memory Intact   Attention Span and Concentration Intact   Intellect Appears to be of Average Intelligence   Insight Intact   Judgement Intact   Muscle Strength No abnormal movements   Language Within Normal Limits   Fund of Knowledge Age appropriate   Pain NA       Assessment/Plan:   1. Continue Buspirone 15mg bid for anxiety  2. Continue Adderall XR 20mg am  3. Continue Melatonin 5mg hs prn insomnia  4. Continue Adderall 10mg at 3 pm  5. Call if any adverse medication side effects  6. Follow up in 2 month     Diagnosis: ADHD, inattentive type, JESSI, Social Anxiety    Controlled Medication Discussion: Discussed with patient Black Box warning on concurrent use of benzodiazepines and opioid medications including sedation, respiratory depression, coma and death. Patient understands the risk of treatment with benzodiazepines in addition to opioids and wants to continue taking those medications.      Psychotherapy Provided: Supportive psychotherapy provided.     Yes  Medication education provided to Martinez.    Visit Time    Visit Start Time: 03:30 pm  Visit Stop Time: 03:40 am  Total Visit Duration: 10 min

## 2024-03-26 NOTE — TELEPHONE ENCOUNTER
Called client, left a message to call my line to schedule a follow up in 3 months with Yaakov Hubbard.

## 2024-07-02 ENCOUNTER — TELEPHONE (OUTPATIENT)
Dept: PSYCHIATRY | Facility: CLINIC | Age: 34
End: 2024-07-02

## 2024-07-02 ENCOUNTER — TELEMEDICINE (OUTPATIENT)
Dept: PSYCHIATRY | Facility: CLINIC | Age: 34
End: 2024-07-02
Payer: COMMERCIAL

## 2024-07-02 DIAGNOSIS — F90.0 ATTENTION DEFICIT HYPERACTIVITY DISORDER (ADHD), PREDOMINANTLY INATTENTIVE TYPE: Primary | ICD-10-CM

## 2024-07-02 DIAGNOSIS — F41.1 GAD (GENERALIZED ANXIETY DISORDER): ICD-10-CM

## 2024-07-02 PROCEDURE — 99212 OFFICE O/P EST SF 10 MIN: CPT

## 2024-07-02 RX ORDER — DEXTROAMPHETAMINE SACCHARATE, AMPHETAMINE ASPARTATE MONOHYDRATE, DEXTROAMPHETAMINE SULFATE AND AMPHETAMINE SULFATE 5; 5; 5; 5 MG/1; MG/1; MG/1; MG/1
20 CAPSULE, EXTENDED RELEASE ORAL EVERY MORNING
Qty: 30 CAPSULE | Refills: 0 | Status: SHIPPED | OUTPATIENT
Start: 2024-07-30

## 2024-07-02 RX ORDER — DEXTROAMPHETAMINE SACCHARATE, AMPHETAMINE ASPARTATE MONOHYDRATE, DEXTROAMPHETAMINE SULFATE AND AMPHETAMINE SULFATE 5; 5; 5; 5 MG/1; MG/1; MG/1; MG/1
20 CAPSULE, EXTENDED RELEASE ORAL EVERY MORNING
Qty: 30 CAPSULE | Refills: 0 | Status: SHIPPED | OUTPATIENT
Start: 2024-08-20

## 2024-07-02 RX ORDER — BUSPIRONE HYDROCHLORIDE 15 MG/1
15 TABLET ORAL 2 TIMES DAILY
Qty: 180 TABLET | Refills: 1 | Status: SHIPPED | OUTPATIENT
Start: 2024-07-02

## 2024-07-02 RX ORDER — DEXTROAMPHETAMINE SACCHARATE, AMPHETAMINE ASPARTATE MONOHYDRATE, DEXTROAMPHETAMINE SULFATE AND AMPHETAMINE SULFATE 5; 5; 5; 5 MG/1; MG/1; MG/1; MG/1
20 CAPSULE, EXTENDED RELEASE ORAL EVERY MORNING
Qty: 30 CAPSULE | Refills: 0 | Status: SHIPPED | OUTPATIENT
Start: 2024-07-02

## 2024-07-02 NOTE — PSYCH
"Psychiatric Medication Management - Behavioral Health   Martinez Davey 34 y.o. male MRN: [id number]        This note was not shared with the patient due to reasonable likelihood of causing patient harm     After connecting through televideo, the patient was identified by name and date of birth. Martinez Davey was informed that this is a telemedicine visit and that the visit is being conducted through the [employer] platform. He agrees to proceed. My office door was closed. No one else was in the room.  He acknowledged consent and understanding of privacy and security of the video platform. The patient has agreed to participate and understands they can discontinue the visit at any time.    Reason for Visit: Medication management    Subjective:   Patient is being treated for ADHD, JESSI, and social anxiety. Patient is observed on Adderall 20 mg po bid, Buspirone 15 mg bid and takes Melatonin 5 mg hs prn OTC. Patient reports trouble sleeping with adderall 20mg. He reports only taking 1 a day due to feeling \"wired.\" He reports multiple nights of tossing and turning and unable to fall asleep. Mood has been stable. 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 Appetite is normal. Good energy and motivation. Patient denies elevated moods, paranoia, panic attacks, ah/vh and si/hi      Review Of Systems:     Constitutional Negative   ENT Negative   Cardiovascular Negative   Respiratory Negative   Gastrointestinal Negative   Genitourinary Negative   Musculoskeletal Negative   Integumentary Negative   Neurological Negative   Endocrine Negative     Past Medical History: pt denies     Past Psychiatric History: treated for ADHD in elementary school    Family Psychiatric History: depression and anxiety    Substance Abuse History: pt denies     Traumatic History: pt denies     The following portions of the patient's history were reviewed and updated as appropriate: " past family history, past medical history, past social history, past surgical history and problem list.      Mental status:  Appearance Casually dressed   Mood stable   Affect Mood congruent   Speech Normal   Thought Processes Goal directed   Associations intact   Hallucinations  Denies any auditory or visual hallucinations   Thought Content No passive or active suicidal or homicidal ideation, intent, or plan   Orientation Oriented to person, place, time, and situation   Recent and Remote Memory Intact   Attention Span and Concentration Intact   Intellect Appears to be of Average Intelligence   Insight Intact   Judgement Intact   Muscle Strength No abnormal movements   Language Within Normal Limits   Fund of Knowledge Age appropriate   Pain NA       Assessment/Plan:   1. Continue Buspirone 15mg bid for anxiety  2. Continue Adderall XR 20mg am  3. Continue Melatonin 5mg hs prn insomnia  4. STOP Adderall 10mg at 3 pm  5. Call if any adverse medication side effects  6. Follow up in 3 month     Diagnosis: ADHD, inattentive type, JESSI, Social Anxiety    Controlled Medication Discussion: Discussed with patient Black Box warning on concurrent use of benzodiazepines and opioid medications including sedation, respiratory depression, coma and death. Patient understands the risk of treatment with benzodiazepines in addition to opioids and wants to continue taking those medications.      Psychotherapy Provided: Supportive psychotherapy provided.     Yes  Medication education provided to Martinez.    Visit Time    Visit Start Time: 01:30 pm  Visit Stop Time: 01:40 pm  Total Visit Duration: 10 min

## 2024-07-02 NOTE — TELEPHONE ENCOUNTER
Writer left client a message to schedule follow up appointment with Yaakov Hubbard.     Client is due 3 months from 7/2/24.

## 2024-08-01 ENCOUNTER — TELEPHONE (OUTPATIENT)
Dept: PSYCHIATRY | Facility: CLINIC | Age: 34
End: 2024-08-01

## 2024-08-01 NOTE — TELEPHONE ENCOUNTER
Writer reached out to client to schedule 3 month follow up with Yaakov Hubbard. Client answered and did not want to reschedule. Writer asked if he would want to reschedule in the future or if he wanted to be discharged. Client said he could be discharged.

## 2024-08-04 NOTE — PROGRESS NOTES
"Psychiatric Discharge Summary     Admit Date: Several years ago  Discharge Date: 8/4/24    Discharge Diagnosis: ADHD, JESSI, social anxiety    Treating Physician: TOMAS Perez      Presenting Problems/Pertinent Findings: Patient is being treated for ADHD, JESSI, and social anxiety. Patient is observed on Adderall 20 mg po bid, Buspirone 15 mg bid and takes Melatonin 5 mg hs prn OTC. Patient reports trouble sleeping with adderall 20mg. He reports only taking 1 a day due to feeling \"wired.\" He reports multiple nights of tossing and turning and unable to fall asleep. Mood has been stable. 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 Appetite is normal. Good energy and motivation. Patient denies elevated moods, paranoia, panic attacks, ah/vh and si/hi          Course of Treatment:  Patient was in treatment with this provider 8/22/23-7/2/24.        Subsequently, the patient withdrew from treatment.    Criteria for Discharge: Withdrew from Treatment    Aftercare Recommendations: Follow up with pcp    Discharge Medications:   Current Outpatient Medications:     [START ON 8/20/2024] amphetamine-dextroamphetamine (ADDERALL XR, 20MG,) 20 MG 24 hr capsule, Take 1 capsule (20 mg total) by mouth every morning Max Daily Amount: 20 mg Do not start before August 20, 2024., Disp: 30 capsule, Rfl: 0    amphetamine-dextroamphetamine (ADDERALL XR, 20MG,) 20 MG 24 hr capsule, Take 1 capsule (20 mg total) by mouth every morning Max Daily Amount: 20 mg Do not start before July 30, 2024., Disp: 30 capsule, Rfl: 0    amphetamine-dextroamphetamine (ADDERALL XR, 20MG,) 20 MG 24 hr capsule, Take 1 capsule (20 mg total) by mouth every morning Max Daily Amount: 20 mg, Disp: 30 capsule, Rfl: 0    busPIRone (BUSPAR) 15 mg tablet, Take 1 tablet (15 mg total) by mouth 2 (two) times a day, Disp: 180 tablet, Rfl: 1       Mental Status at Time of most recent visit on 7/2/24.     Mental " status:  Appearance Casually dressed   Mood stable   Affect Mood congruent   Speech Normal   Thought Processes Goal directed   Associations intact   Hallucinations  Denies any auditory or visual hallucinations   Thought Content No passive or active suicidal or homicidal ideation, intent, or plan   Orientation Oriented to person, place, time, and situation   Recent and Remote Memory Intact   Attention Span and Concentration Intact   Intellect Appears to be of Average Intelligence   Insight Intact   Judgement Intact   Muscle Strength No abnormal movements   Language Within Normal Limits   Fund of Knowledge Age appropriate   Pain NA

## 2024-09-03 ENCOUNTER — TELEMEDICINE (OUTPATIENT)
Dept: PSYCHIATRY | Facility: CLINIC | Age: 34
End: 2024-09-03
Payer: COMMERCIAL

## 2024-09-03 ENCOUNTER — TELEPHONE (OUTPATIENT)
Dept: PSYCHIATRY | Facility: CLINIC | Age: 34
End: 2024-09-03

## 2024-09-03 DIAGNOSIS — F41.1 GAD (GENERALIZED ANXIETY DISORDER): ICD-10-CM

## 2024-09-03 DIAGNOSIS — F90.0 ATTENTION DEFICIT HYPERACTIVITY DISORDER (ADHD), PREDOMINANTLY INATTENTIVE TYPE: ICD-10-CM

## 2024-09-03 PROCEDURE — 99212 OFFICE O/P EST SF 10 MIN: CPT

## 2024-09-03 RX ORDER — DEXTROAMPHETAMINE SACCHARATE, AMPHETAMINE ASPARTATE MONOHYDRATE, DEXTROAMPHETAMINE SULFATE AND AMPHETAMINE SULFATE 5; 5; 5; 5 MG/1; MG/1; MG/1; MG/1
20 CAPSULE, EXTENDED RELEASE ORAL EVERY MORNING
Qty: 30 CAPSULE | Refills: 0 | Status: SHIPPED | OUTPATIENT
Start: 2024-09-03

## 2024-09-03 RX ORDER — DEXTROAMPHETAMINE SACCHARATE, AMPHETAMINE ASPARTATE MONOHYDRATE, DEXTROAMPHETAMINE SULFATE AND AMPHETAMINE SULFATE 5; 5; 5; 5 MG/1; MG/1; MG/1; MG/1
20 CAPSULE, EXTENDED RELEASE ORAL EVERY MORNING
Qty: 30 CAPSULE | Refills: 0 | Status: SHIPPED | OUTPATIENT
Start: 2024-10-29

## 2024-09-03 RX ORDER — BUSPIRONE HYDROCHLORIDE 15 MG/1
15 TABLET ORAL 2 TIMES DAILY
Qty: 180 TABLET | Refills: 0 | Status: SHIPPED | OUTPATIENT
Start: 2024-09-03

## 2024-09-03 RX ORDER — DEXTROAMPHETAMINE SACCHARATE, AMPHETAMINE ASPARTATE MONOHYDRATE, DEXTROAMPHETAMINE SULFATE AND AMPHETAMINE SULFATE 5; 5; 5; 5 MG/1; MG/1; MG/1; MG/1
20 CAPSULE, EXTENDED RELEASE ORAL EVERY MORNING
Qty: 30 CAPSULE | Refills: 0 | Status: SHIPPED | OUTPATIENT
Start: 2024-10-01

## 2024-09-03 NOTE — TELEPHONE ENCOUNTER
Writer called client due to client being scheduled while previously discharged from Yaakov Hubbard.     When asked if he wanted to reschedule early August he said no, and said he could be discharged when writer asked if he wanted to be seen in the future of be discharged (see 8/1/24 encounter). Client may not have understood the scope of writer's question and does NOT wish to be discharged.    Please advise if client can be seen today at 11 am with Yaakov Hubbard.    Client was informed that yearly paperwork is due so that he can get started on that in case reinstatement is possible.     Writer will call back with response.

## 2024-09-03 NOTE — PSYCH
Psychiatric Medication Management - Behavioral Health   Martinez Davey 34 y.o. male MRN: [id number]        This note was not shared with the patient due to reasonable likelihood of causing patient harm     After connecting through televideo, the patient was identified by name and date of birth. Martinez Davey was informed that this is a telemedicine visit and that the visit is being conducted through the Epic Embedded platform. He agrees to proceed. My office door was closed. No one else was in the room.  He acknowledged consent and understanding of privacy and security of the video platform. The patient has agreed to participate and understands they can discontinue the visit at any time.    Reason for Visit: Medication management    Subjective:   Patient is being treated for ADHD, JESSI, and social anxiety. Patient is observed on Adderall XR 20 mg po daily, Buspirone 15 mg bid and takes Melatonin 5 mg hs prn OTC. Patient discontinued Adderall 10mg at 3pm and doing well. Mood stable. Sleep has been improving. Appetite is normal. Good energy and motivation. Patient denies elevated moods, paranoia, panic attacks, ah/vh and si/hi      Review Of Systems:     Constitutional Negative   ENT Negative   Cardiovascular Negative   Respiratory Negative   Gastrointestinal Negative   Genitourinary Negative   Musculoskeletal Negative   Integumentary Negative   Neurological Negative   Endocrine Negative     Past Medical History: pt denies     Past Psychiatric History: treated for ADHD in elementary school    Family Psychiatric History: depression and anxiety    Substance Abuse History: pt denies     Traumatic History: pt denies     The following portions of the patient's history were reviewed and updated as appropriate: past family history, past medical history, past social history, past surgical history and problem list.      Mental status:  Appearance Casually dressed   Mood stable   Affect Mood congruent   Speech Normal    Thought Processes Goal directed   Associations intact   Hallucinations  Denies any auditory or visual hallucinations   Thought Content No passive or active suicidal or homicidal ideation, intent, or plan   Orientation Oriented to person, place, time, and situation   Recent and Remote Memory Intact   Attention Span and Concentration Intact   Intellect Appears to be of Average Intelligence   Insight Intact   Judgement Intact   Muscle Strength No abnormal movements   Language Within Normal Limits   Fund of Knowledge Age appropriate   Pain NA       Assessment/Plan:   1. Continue Buspirone 15mg bid for anxiety  2. Continue Adderall XR 20mg am  3. Continue Melatonin 5mg hs prn insomnia OTC  4. Call if any adverse medication side effects  6. Follow up in 3 month     Diagnosis: ADHD, inattentive type, JESSI, Social Anxiety    Controlled Medication Discussion: Discussed with patient Black Box warning on concurrent use of benzodiazepines and opioid medications including sedation, respiratory depression, coma and death. Patient understands the risk of treatment with benzodiazepines in addition to opioids and wants to continue taking those medications.      Psychotherapy Provided: Supportive psychotherapy provided.     Yes  Medication education provided to Martinez.    Visit Time    Visit Start Time: 11:00 am  Visit Stop Time: 11:10 am  Total Visit Duration: 10 min

## 2024-10-10 ENCOUNTER — TELEPHONE (OUTPATIENT)
Age: 34
End: 2024-10-10

## 2024-10-10 NOTE — TELEPHONE ENCOUNTER
Patient contacted the office to schedule a follow up visit with provider. Patient is now scheduled for 10/16/24  at 3:30pm virtually.

## 2024-10-16 ENCOUNTER — TELEMEDICINE (OUTPATIENT)
Dept: PSYCHIATRY | Facility: CLINIC | Age: 34
End: 2024-10-16
Payer: COMMERCIAL

## 2024-10-16 ENCOUNTER — TELEPHONE (OUTPATIENT)
Dept: PSYCHIATRY | Facility: CLINIC | Age: 34
End: 2024-10-16

## 2024-10-16 DIAGNOSIS — F41.1 GAD (GENERALIZED ANXIETY DISORDER): ICD-10-CM

## 2024-10-16 DIAGNOSIS — F90.0 ATTENTION DEFICIT HYPERACTIVITY DISORDER (ADHD), PREDOMINANTLY INATTENTIVE TYPE: ICD-10-CM

## 2024-10-16 PROCEDURE — 99212 OFFICE O/P EST SF 10 MIN: CPT

## 2024-10-16 RX ORDER — DEXTROAMPHETAMINE SACCHARATE, AMPHETAMINE ASPARTATE MONOHYDRATE, DEXTROAMPHETAMINE SULFATE AND AMPHETAMINE SULFATE 5; 5; 5; 5 MG/1; MG/1; MG/1; MG/1
20 CAPSULE, EXTENDED RELEASE ORAL EVERY MORNING
Qty: 30 CAPSULE | Refills: 0 | Status: SHIPPED | OUTPATIENT
Start: 2024-11-26

## 2024-10-16 RX ORDER — BUSPIRONE HYDROCHLORIDE 15 MG/1
15 TABLET ORAL 2 TIMES DAILY
Qty: 180 TABLET | Refills: 0 | Status: SHIPPED | OUTPATIENT
Start: 2024-10-16

## 2024-10-16 NOTE — PSYCH
Psychiatric Medication Management - Behavioral Health   Martinez Davey 34 y.o. male MRN: [id number]        This note was not shared with the patient due to reasonable likelihood of causing patient harm     Assessment/Plan:   1. Continue Buspirone 15mg bid for anxiety  2. Continue Adderall XR 20mg am  3. Continue Melatonin 5mg hs prn insomnia OTC  4. Call if any adverse medication side effects  6. Follow up in 3 month     Diagnosis: ADHD, inattentive type, JESSI, Social Anxiety    After connecting through [employer], the patient was identified by name and date of birth. Martinez Davey was informed that this is a telemedicine visit and that the visit is being conducted through the Epic Embedded platform. He agrees to proceed. My office door was closed. No one else was in the room.  He acknowledged consent and understanding of privacy and security of the video platform. The patient has agreed to participate and understands they can discontinue the visit at any time.    Reason for Visit: Medication management    Subjective:   Patient is being treated for ADHD, JESSI, and social anxiety. Patient is observed on Adderall XR 20 mg po daily, Buspirone 15 mg bid and takes Melatonin 5 mg hs prn OTC. Patient reports getting a staph infection, which has resolved. Mood remains stable. Sleep and appetite is adequate. Good energy and motivation. Patient denies elevated moods, paranoia, panic attacks, ah/vh and si/hi       Review Of Systems:     Constitutional Negative   ENT Negative   Cardiovascular Negative   Respiratory Negative   Gastrointestinal Negative   Genitourinary Negative   Musculoskeletal Negative   Integumentary Negative   Neurological Negative   Endocrine Negative     Past Medical History: pt denies     Past Psychiatric History: treated for ADHD in elementary school    Family Psychiatric History: depression and anxiety    Substance Abuse History: pt denies     Traumatic History: pt denies     The following portions of  the patient's history were reviewed and updated as appropriate: past family history, past medical history, past social history, past surgical history and problem list.      Mental status:  Appearance Casually dressed   Mood stable   Affect Mood congruent   Speech Normal   Thought Processes Goal directed   Associations intact   Hallucinations  Denies any auditory or visual hallucinations   Thought Content No passive or active suicidal or homicidal ideation, intent, or plan   Orientation Oriented to person, place, time, and situation   Recent and Remote Memory Intact   Attention Span and Concentration Intact   Intellect Appears to be of Average Intelligence   Insight Intact   Judgement Intact   Muscle Strength No abnormal movements   Language Within Normal Limits   Fund of Knowledge Age appropriate   Pain NA         Controlled Medication Discussion: Discussed with patient Black Box warning on concurrent use of benzodiazepines and opioid medications including sedation, respiratory depression, coma and death. Patient understands the risk of treatment with benzodiazepines in addition to opioids and wants to continue taking those medications.      Psychotherapy Provided: Supportive psychotherapy provided.     Yes  Medication education provided to Martinez.    Visit Time    Visit Start Time: 3:30 pm  Visit Stop Time: 3:40 pm  Total Visit Duration: 10 min

## 2024-12-02 ENCOUNTER — HOSPITAL ENCOUNTER (EMERGENCY)
Dept: HOSPITAL 99 - EMR | Age: 34
Discharge: HOME | End: 2024-12-02
Payer: COMMERCIAL

## 2024-12-02 VITALS — DIASTOLIC BLOOD PRESSURE: 107 MMHG | SYSTOLIC BLOOD PRESSURE: 143 MMHG | RESPIRATION RATE: 16 BRPM

## 2024-12-02 VITALS — RESPIRATION RATE: 11 BRPM

## 2024-12-02 VITALS — RESPIRATION RATE: 21 BRPM

## 2024-12-02 VITALS — RESPIRATION RATE: 17 BRPM

## 2024-12-02 VITALS — RESPIRATION RATE: 18 BRPM

## 2024-12-02 VITALS — RESPIRATION RATE: 16 BRPM

## 2024-12-02 DIAGNOSIS — K90.0: ICD-10-CM

## 2024-12-02 DIAGNOSIS — Z88.1: ICD-10-CM

## 2024-12-02 DIAGNOSIS — J45.901: Primary | ICD-10-CM

## 2024-12-02 DIAGNOSIS — F32.A: ICD-10-CM

## 2024-12-02 PROCEDURE — 99283 EMERGENCY DEPT VISIT LOW MDM: CPT

## 2024-12-02 RX ADMIN — PREDNISONE 50 MG: 50 TABLET ORAL at 13:13

## 2025-01-09 ENCOUNTER — TELEPHONE (OUTPATIENT)
Age: 35
End: 2025-01-09

## 2025-01-17 ENCOUNTER — TELEMEDICINE (OUTPATIENT)
Dept: PSYCHIATRY | Facility: CLINIC | Age: 35
End: 2025-01-17
Payer: COMMERCIAL

## 2025-01-17 ENCOUNTER — TELEPHONE (OUTPATIENT)
Dept: PSYCHIATRY | Facility: CLINIC | Age: 35
End: 2025-01-17

## 2025-01-17 DIAGNOSIS — F41.1 GAD (GENERALIZED ANXIETY DISORDER): ICD-10-CM

## 2025-01-17 DIAGNOSIS — F90.0 ATTENTION DEFICIT HYPERACTIVITY DISORDER (ADHD), PREDOMINANTLY INATTENTIVE TYPE: ICD-10-CM

## 2025-01-17 PROCEDURE — 98006 SYNCH AUDIO-VIDEO EST MOD 30: CPT

## 2025-01-17 RX ORDER — DEXTROAMPHETAMINE SACCHARATE, AMPHETAMINE ASPARTATE MONOHYDRATE, DEXTROAMPHETAMINE SULFATE AND AMPHETAMINE SULFATE 5; 5; 5; 5 MG/1; MG/1; MG/1; MG/1
20 CAPSULE, EXTENDED RELEASE ORAL EVERY MORNING
Qty: 30 CAPSULE | Refills: 0 | Status: SHIPPED | OUTPATIENT
Start: 2025-01-17

## 2025-01-17 RX ORDER — DEXTROAMPHETAMINE SACCHARATE, AMPHETAMINE ASPARTATE MONOHYDRATE, DEXTROAMPHETAMINE SULFATE AND AMPHETAMINE SULFATE 5; 5; 5; 5 MG/1; MG/1; MG/1; MG/1
20 CAPSULE, EXTENDED RELEASE ORAL EVERY MORNING
Qty: 30 CAPSULE | Refills: 0 | Status: SHIPPED | OUTPATIENT
Start: 2025-02-14

## 2025-01-17 RX ORDER — BUSPIRONE HYDROCHLORIDE 15 MG/1
15 TABLET ORAL 3 TIMES DAILY
Qty: 270 TABLET | Refills: 0 | Status: SHIPPED | OUTPATIENT
Start: 2025-01-17

## 2025-01-17 RX ORDER — DEXTROAMPHETAMINE SACCHARATE, AMPHETAMINE ASPARTATE, DEXTROAMPHETAMINE SULFATE AND AMPHETAMINE SULFATE 2.5; 2.5; 2.5; 2.5 MG/1; MG/1; MG/1; MG/1
TABLET ORAL
Qty: 30 TABLET | Refills: 0 | Status: SHIPPED | OUTPATIENT
Start: 2025-02-14

## 2025-01-17 RX ORDER — DEXTROAMPHETAMINE SACCHARATE, AMPHETAMINE ASPARTATE, DEXTROAMPHETAMINE SULFATE AND AMPHETAMINE SULFATE 2.5; 2.5; 2.5; 2.5 MG/1; MG/1; MG/1; MG/1
TABLET ORAL
Qty: 30 TABLET | Refills: 0 | Status: SHIPPED | OUTPATIENT
Start: 2025-03-14

## 2025-01-17 RX ORDER — DEXTROAMPHETAMINE SACCHARATE, AMPHETAMINE ASPARTATE MONOHYDRATE, DEXTROAMPHETAMINE SULFATE AND AMPHETAMINE SULFATE 5; 5; 5; 5 MG/1; MG/1; MG/1; MG/1
20 CAPSULE, EXTENDED RELEASE ORAL EVERY MORNING
Qty: 30 CAPSULE | Refills: 0 | Status: SHIPPED | OUTPATIENT
Start: 2025-03-14

## 2025-01-17 RX ORDER — DEXTROAMPHETAMINE SACCHARATE, AMPHETAMINE ASPARTATE, DEXTROAMPHETAMINE SULFATE AND AMPHETAMINE SULFATE 2.5; 2.5; 2.5; 2.5 MG/1; MG/1; MG/1; MG/1
TABLET ORAL
Qty: 30 TABLET | Refills: 0 | Status: SHIPPED | OUTPATIENT
Start: 2025-01-17

## 2025-01-17 NOTE — TELEPHONE ENCOUNTER
Writer called and scheduled 3 month follow up with Yaakov Hubbard and let client know of [facility] New Patient forms [facility]

## 2025-01-17 NOTE — PSYCH
Psychiatric Medication Management - Behavioral Health   Martinez Davey 34 y.o. male MRN: [id number]        This note was not shared with the patient due to reasonable likelihood of causing patient harm     Assessment/Plan:   1. INCREASE Buspirone 15mg tid for anxiety  2. Continue Adderall XR 20mg am  3. Continue Melatonin 5mg hs prn insomnia OTC  4. START Adderall 10mg po daily in afternoon  6. Follow up in 3 month     Diagnosis: ADHD, inattentive type, JESSI, Social Anxiety    After connecting through [email or web address], the patient was identified by name and date of birth. Martinez Davey was informed that this is a telemedicine visit and that the visit is being conducted through the Epic Embedded platform. He agrees to proceed. My office door was closed. No one else was in the room.  He acknowledged consent and understanding of privacy and security of the video platform. The patient has agreed to participate and understands they can discontinue the visit at any time.    Reason for Visit: Medication management    Subjective:   Patient is being treated for ADHD, JESSI, and social anxiety. Patient is observed on Adderall XR 20 mg po daily, Buspirone 15 mg bid and takes Melatonin 5 mg hs prn OTC. Patient reports over the past 3 months his currently dose of adderall seems to wear off by 2-3pm. He reports feeling tired, unmotivated and has difficulty focusing by the end of his work day. Patient was previously prescribed adderall 10mg in the afternoon, which was discontinued a few months ago. Patient also reports his anxiety gets worse around mid afternoon with increased racing thoughts and worries. Sleep and appetite is adequate. Good energy and motivation. Patient denies elevated moods, paranoia, panic attacks, ah/vh and si/hi        Review Of Systems:     Constitutional Negative   ENT Negative   Cardiovascular Negative   Respiratory Negative   Gastrointestinal Negative   Genitourinary Negative   Musculoskeletal Negative    Integumentary Negative   Neurological Negative   Endocrine Negative     Past Medical History: pt denies     Past Psychiatric History: treated for ADHD in elementary school    Family Psychiatric History: depression and anxiety    Substance Abuse History: pt denies     Traumatic History: pt denies     The following portions of the patient's history were reviewed and updated as appropriate: past family history, past medical history, past social history, past surgical history and problem list.      Mental status:  Appearance Casually dressed   Mood stable   Affect Mood congruent   Speech Normal   Thought Processes Goal directed   Associations intact   Hallucinations  Denies any auditory or visual hallucinations   Thought Content No passive or active suicidal or homicidal ideation, intent, or plan   Orientation Oriented to person, place, time, and situation   Recent and Remote Memory Intact   Attention Span and Concentration Intact   Intellect Appears to be of Average Intelligence   Insight Intact   Judgement Intact   Muscle Strength No abnormal movements   Language Within Normal Limits   Fund of Knowledge Age appropriate   Pain NA         Controlled Medication Discussion: Discussed with patient Black Box warning on concurrent use of benzodiazepines and opioid medications including sedation, respiratory depression, coma and death. Patient understands the risk of treatment with benzodiazepines in addition to opioids and wants to continue taking those medications.      Psychotherapy Provided: Supportive psychotherapy provided.     Yes  Medication education provided to Martinez.    Visit Time    Visit Start Time: 3:30 pm  Visit Stop Time: 3:40 pm  Total Visit Duration: 10 min

## 2025-04-11 ENCOUNTER — TELEPHONE (OUTPATIENT)
Dept: PSYCHIATRY | Facility: CLINIC | Age: 35
End: 2025-04-11

## 2025-04-11 NOTE — TELEPHONE ENCOUNTER
Writer left voicemail letting client know his [facility] New Patient forms to be updated are available in his [employer] [facility] or he may have just gotten an email notification.

## 2025-04-18 ENCOUNTER — TELEPHONE (OUTPATIENT)
Dept: PSYCHIATRY | Facility: CLINIC | Age: 35
End: 2025-04-18

## 2025-04-18 ENCOUNTER — TELEMEDICINE (OUTPATIENT)
Dept: PSYCHIATRY | Facility: CLINIC | Age: 35
End: 2025-04-18
Payer: COMMERCIAL

## 2025-04-18 DIAGNOSIS — F41.1 GAD (GENERALIZED ANXIETY DISORDER): ICD-10-CM

## 2025-04-18 DIAGNOSIS — F90.0 ATTENTION DEFICIT HYPERACTIVITY DISORDER (ADHD), PREDOMINANTLY INATTENTIVE TYPE: ICD-10-CM

## 2025-04-18 PROCEDURE — 98006 SYNCH AUDIO-VIDEO EST MOD 30: CPT

## 2025-04-18 RX ORDER — DEXTROAMPHETAMINE SACCHARATE, AMPHETAMINE ASPARTATE, DEXTROAMPHETAMINE SULFATE AND AMPHETAMINE SULFATE 2.5; 2.5; 2.5; 2.5 MG/1; MG/1; MG/1; MG/1
TABLET ORAL
Qty: 30 TABLET | Refills: 0 | Status: SHIPPED | OUTPATIENT
Start: 2025-05-08

## 2025-04-18 RX ORDER — DEXTROAMPHETAMINE SACCHARATE, AMPHETAMINE ASPARTATE MONOHYDRATE, DEXTROAMPHETAMINE SULFATE AND AMPHETAMINE SULFATE 5; 5; 5; 5 MG/1; MG/1; MG/1; MG/1
20 CAPSULE, EXTENDED RELEASE ORAL EVERY MORNING
Qty: 30 CAPSULE | Refills: 0 | Status: SHIPPED | OUTPATIENT
Start: 2025-06-05

## 2025-04-18 RX ORDER — DEXTROAMPHETAMINE SACCHARATE, AMPHETAMINE ASPARTATE MONOHYDRATE, DEXTROAMPHETAMINE SULFATE AND AMPHETAMINE SULFATE 5; 5; 5; 5 MG/1; MG/1; MG/1; MG/1
20 CAPSULE, EXTENDED RELEASE ORAL EVERY MORNING
Qty: 30 CAPSULE | Refills: 0 | Status: SHIPPED | OUTPATIENT
Start: 2025-07-03

## 2025-04-18 RX ORDER — DEXTROAMPHETAMINE SACCHARATE, AMPHETAMINE ASPARTATE, DEXTROAMPHETAMINE SULFATE AND AMPHETAMINE SULFATE 2.5; 2.5; 2.5; 2.5 MG/1; MG/1; MG/1; MG/1
TABLET ORAL
Qty: 30 TABLET | Refills: 0 | Status: SHIPPED | OUTPATIENT
Start: 2025-06-05

## 2025-04-18 RX ORDER — BUSPIRONE HYDROCHLORIDE 15 MG/1
15 TABLET ORAL 3 TIMES DAILY
Qty: 270 TABLET | Refills: 0 | Status: SHIPPED | OUTPATIENT
Start: 2025-04-18

## 2025-04-18 RX ORDER — DEXTROAMPHETAMINE SACCHARATE, AMPHETAMINE ASPARTATE, DEXTROAMPHETAMINE SULFATE AND AMPHETAMINE SULFATE 2.5; 2.5; 2.5; 2.5 MG/1; MG/1; MG/1; MG/1
TABLET ORAL
Qty: 30 TABLET | Refills: 0 | Status: SHIPPED | OUTPATIENT
Start: 2025-07-03

## 2025-04-18 RX ORDER — DEXTROAMPHETAMINE SACCHARATE, AMPHETAMINE ASPARTATE MONOHYDRATE, DEXTROAMPHETAMINE SULFATE AND AMPHETAMINE SULFATE 5; 5; 5; 5 MG/1; MG/1; MG/1; MG/1
20 CAPSULE, EXTENDED RELEASE ORAL EVERY MORNING
Qty: 30 CAPSULE | Refills: 0 | Status: SHIPPED | OUTPATIENT
Start: 2025-05-08

## 2025-04-18 NOTE — PSYCH
Psychiatric Medication Management - Behavioral Health   Martinez Davey 35 y.o. male MRN: [id number]        This note was not shared with the patient due to reasonable likelihood of causing patient harm     Administrative Statements   Encounter provider TOMAS Perez    The Patient is located at Home and in the following state in which I hold an active license [place].    The patient was identified by name and date of birth. Martinez Davey was informed that this is a telemedicine visit and that the visit is being conducted through the Epic Embedded platform. He agrees to proceed..  My office door was closed. No one else was in the room.  He acknowledged consent and understanding of privacy and security of the video platform. The patient has agreed to participate and understands they can discontinue the visit at any time.    I have spent a total time of 15 minutes in caring for this patient on the day of the visit/encounter including Risks and benefits of tx options, Instructions for management, Patient and family education, and Importance of tx compliance, not including the time spent for establishing the audio/video connection.      Assessment/Plan:   1. Continue Buspirone 15mg tid for anxiety  2. Continue Adderall XR 20mg am  3. Continue Melatonin 5mg hs prn insomnia OTC  4. Continue Adderall 10mg po daily in afternoon  6. Follow up in 3 month     Diagnosis: ADHD, inattentive type, JESSI, Social Anxiety      Reason for Visit: Medication management    Subjective:   Patient is being treated for ADHD, JESSI, and social anxiety. Patient is observed on Adderall XR 20 mg po daily and adderall 10mg po daily in afternoon, Buspirone 15 mg tid and takes Melatonin 5 mg hs prn OTC. Improved ADHD symptoms with better focus, motivation and less distracted. Anxiety has also improved with increase in buspar. Denies depression. Sleep and appetite is adequate. Good energy and motivation. Patient denies elevated moods, paranoia, panic  attacks, ah/vh and si/hi        Review Of Systems:     Constitutional Negative   ENT Negative   Cardiovascular Negative   Respiratory Negative   Gastrointestinal Negative   Genitourinary Negative   Musculoskeletal Negative   Integumentary Negative   Neurological Negative   Endocrine Negative     Past Medical History: pt denies     Past Psychiatric History: treated for ADHD in elementary school    Family Psychiatric History: depression and anxiety    Substance Abuse History: pt denies     Traumatic History: pt denies     The following portions of the patient's history were reviewed and updated as appropriate: past family history, past medical history, past social history, past surgical history and problem list.      Mental status:  Appearance Casually dressed   Mood stable   Affect Mood congruent   Speech Normal   Thought Processes Goal directed   Associations intact   Hallucinations  Denies any auditory or visual hallucinations   Thought Content No passive or active suicidal or homicidal ideation, intent, or plan   Orientation Oriented to person, place, time, and situation   Recent and Remote Memory Intact   Attention Span and Concentration Intact   Intellect Appears to be of Average Intelligence   Insight Intact   Judgement Intact   Muscle Strength No abnormal movements   Language Within Normal Limits   Fund of Knowledge Age appropriate   Pain NA         Controlled Medication Discussion: Discussed with patient Black Box warning on concurrent use of benzodiazepines and opioid medications including sedation, respiratory depression, coma and death. Patient understands the risk of treatment with benzodiazepines in addition to opioids and wants to continue taking those medications.      Psychotherapy Provided: Supportive psychotherapy provided.     Yes  Medication education provided to Martinez.    Visit Time    Visit Start Time: 4:00 pm  Visit Stop Time: 4:15 pm  Total Visit Duration: 15 min

## 2025-06-16 DIAGNOSIS — F90.0 ATTENTION DEFICIT HYPERACTIVITY DISORDER (ADHD), PREDOMINANTLY INATTENTIVE TYPE: ICD-10-CM

## 2025-06-16 NOTE — TELEPHONE ENCOUNTER
Refill must be reviewed and completed by the office or provider. The refill is unable to be approved or denied by the medication management team.    05/17/2025 05/16/2025 04/18/2025 Dextroamph Sacc-amph Asp-dextroam S (Capsule, Extended Release) 30.0 30 20 MG NA [employer] [place] / 0 [place]   1 [id number] 04/14/2025 04/10/2025 01/17/2025 Mixed Amphetamine Salts (Capsule, Extended Release) 30.0 30 20 MG NA [employer] [place]   1 [id number] 02/26/2025 02/24/2025 01/17/2025 Mixed Amphetamine Salts (Capsule, Extended Release) 30.0 30 20 MG NA [employer] Commercial [place] / 0 [place]

## 2025-06-16 NOTE — TELEPHONE ENCOUNTER
Reason for call:   [x] Refill   [] Prior Auth  [] Other:     Office:   [] PCP/Provider -   [x] [facility]     Medication: Adderall XR    Dose/Frequency: 10 mg 24 hr capsule     Quantity: #30    Pharmacy: [facility] [id number] [facility]   Does the patient have enough for 3 days?   [x] Yes   [] No - Send as HP to POD    Mail Away Pharmacy   Does the patient have enough for 10 days?   [] Yes   [] No - Send as HP to POD

## 2025-06-17 RX ORDER — DEXTROAMPHETAMINE SACCHARATE, AMPHETAMINE ASPARTATE MONOHYDRATE, DEXTROAMPHETAMINE SULFATE AND AMPHETAMINE SULFATE 5; 5; 5; 5 MG/1; MG/1; MG/1; MG/1
20 CAPSULE, EXTENDED RELEASE ORAL EVERY MORNING
Qty: 30 CAPSULE | Refills: 0 | Status: SHIPPED | OUTPATIENT
Start: 2025-06-17

## 2025-06-20 ENCOUNTER — TELEPHONE (OUTPATIENT)
Dept: PSYCHIATRY | Facility: CLINIC | Age: 35
End: 2025-06-20

## 2025-06-20 NOTE — TELEPHONE ENCOUNTER
Patient called back, and the follow up appointment was established for 7.18.25 @ 2:00pm.    Writer routed the encounter to clerical for an update.

## 2025-07-18 ENCOUNTER — TELEMEDICINE (OUTPATIENT)
Dept: PSYCHIATRY | Facility: CLINIC | Age: 35
End: 2025-07-18
Payer: COMMERCIAL

## 2025-07-18 ENCOUNTER — TELEPHONE (OUTPATIENT)
Dept: PSYCHIATRY | Facility: CLINIC | Age: 35
End: 2025-07-18

## 2025-07-18 DIAGNOSIS — F90.0 ATTENTION DEFICIT HYPERACTIVITY DISORDER (ADHD), PREDOMINANTLY INATTENTIVE TYPE: Primary | ICD-10-CM

## 2025-07-18 DIAGNOSIS — F41.1 GAD (GENERALIZED ANXIETY DISORDER): ICD-10-CM

## 2025-07-18 PROCEDURE — 98006 SYNCH AUDIO-VIDEO EST MOD 30: CPT

## 2025-07-18 NOTE — TELEPHONE ENCOUNTER
Writer called client and scheduled 1 month follow up with Yaakov Hubbard. Client will pay copay over [employer].    IMPROVE score 1 low risk for DVT

## 2025-07-18 NOTE — PSYCH
"Psychiatric Medication Management - Behavioral Health   Martinez Davey 35 y.o. male MRN: [id number]        This note was not shared with the patient due to reasonable likelihood of causing patient harm     Administrative Statements   Encounter provider TOMAS Perez    The Patient is located at Home and in the following state in which I hold an active license [place].    The patient was identified by name and date of birth. Martinez Davey was informed that this is a telemedicine visit and that the visit is being conducted through the Epic Embedded platform. He agrees to proceed..  My office door was closed. No one else was in the room.  He acknowledged consent and understanding of privacy and security of the video platform. The patient has agreed to participate and understands they can discontinue the visit at any time.    I have spent a total time of 29 minutes in caring for this patient on the day of the visit/encounter including Risks and benefits of tx options, Instructions for management, Patient and family education, and Importance of tx compliance, not including the time spent for establishing the audio/video connection.        Assessment/Plan:   1. Continue Buspirone 15mg tid for anxiety  2. STOP Adderall XR  3. Continue Melatonin 5mg hs prn insomnia OTC  4. STOP Adderall 10mg   5. START Vyvanse 40mg po daily  6. Follow up in 3 month     Diagnosis: ADHD, inattentive type, JESSI, Social Anxiety      Reason for Visit: Medication management    Subjective:   Patient is being treated for ADHD, JESSI, and social anxiety. Patient is observed on Adderall XR 20 mg po daily and adderall 10mg po daily in afternoon, Buspirone 15 mg tid and takes Melatonin 5 mg hs prn OTC. He tolerates the medications, has been compliant and denies any side effects. Patient reports mood has been stable with low anxiety. He is inquiring about vyvanse due to adderall being less effective and \"wearing off too quickly.\" He reports if he takes " the afternoon dose, it starts to affect his sleep. In addition, he reports a slight regression in focus. Sleep and appetite is adequate. Lower energy and motivation. He denies si/hi.       Review Of Systems:     Constitutional Negative   ENT Negative   Cardiovascular Negative   Respiratory Negative   Gastrointestinal Negative   Genitourinary Negative   Musculoskeletal Negative   Integumentary Negative   Neurological Negative   Endocrine Negative     Past Medical History: pt denies     Past Psychiatric History: treated for ADHD in elementary school    Family Psychiatric History: depression and anxiety    Substance Abuse History: pt denies     Traumatic History: pt denies     The following portions of the patient's history were reviewed and updated as appropriate: past family history, past medical history, past social history, past surgical history and problem list.      Mental status:  Appearance Casually dressed   Mood stable   Affect Mood congruent   Speech Normal   Thought Processes Goal directed   Associations intact   Hallucinations  Denies any auditory or visual hallucinations   Thought Content No passive or active suicidal or homicidal ideation, intent, or plan   Orientation Oriented to person, place, time, and situation   Recent and Remote Memory Intact   Attention Span and Concentration Intact   Intellect Appears to be of Average Intelligence   Insight Intact   Judgement Intact   Muscle Strength No abnormal movements   Language Within Normal Limits   Fund of Knowledge Age appropriate   Pain NA         Controlled Medication Discussion: Discussed with patient Black Box warning on concurrent use of benzodiazepines and opioid medications including sedation, respiratory depression, coma and death. Patient understands the risk of treatment with benzodiazepines in addition to opioids and wants to continue taking those medications.      Psychotherapy Provided: Supportive psychotherapy provided.     Yes  Medication  education provided to Martinez.    Visit Time    Visit Start Time: 2:00 pm  Visit Stop Time: 2:29 pm  Total Visit Duration: 29 min

## 2025-07-20 RX ORDER — LISDEXAMFETAMINE DIMESYLATE 40 MG/1
40 CAPSULE ORAL EVERY MORNING
Qty: 30 CAPSULE | Refills: 0 | Status: SHIPPED | OUTPATIENT
Start: 2025-07-20 | End: 2025-07-21 | Stop reason: SDUPTHER

## 2025-07-20 RX ORDER — BUSPIRONE HYDROCHLORIDE 15 MG/1
15 TABLET ORAL 3 TIMES DAILY
Qty: 270 TABLET | Refills: 0 | Status: SHIPPED | OUTPATIENT
Start: 2025-07-20

## 2025-07-21 DIAGNOSIS — F90.0 ATTENTION DEFICIT HYPERACTIVITY DISORDER (ADHD), PREDOMINANTLY INATTENTIVE TYPE: ICD-10-CM

## 2025-07-21 RX ORDER — LISDEXAMFETAMINE DIMESYLATE 40 MG/1
40 CAPSULE ORAL EVERY MORNING
Qty: 30 CAPSULE | Refills: 0 | Status: SHIPPED | OUTPATIENT
Start: 2025-07-21

## 2025-08-22 ENCOUNTER — TELEPHONE (OUTPATIENT)
Dept: PSYCHIATRY | Facility: CLINIC | Age: 35
End: 2025-08-22